# Patient Record
Sex: MALE | Race: WHITE | NOT HISPANIC OR LATINO | Employment: PART TIME | URBAN - METROPOLITAN AREA
[De-identification: names, ages, dates, MRNs, and addresses within clinical notes are randomized per-mention and may not be internally consistent; named-entity substitution may affect disease eponyms.]

---

## 2018-07-11 ENCOUNTER — OFFICE VISIT CONVERTED (OUTPATIENT)
Dept: PODIATRY | Facility: CLINIC | Age: 33
End: 2018-07-11
Attending: PODIATRIST

## 2018-08-13 ENCOUNTER — CONVERSION ENCOUNTER (OUTPATIENT)
Dept: PODIATRY | Facility: CLINIC | Age: 33
End: 2018-08-13

## 2018-08-13 ENCOUNTER — OFFICE VISIT CONVERTED (OUTPATIENT)
Dept: PODIATRY | Facility: CLINIC | Age: 33
End: 2018-08-13
Attending: PODIATRIST

## 2018-08-27 ENCOUNTER — OFFICE VISIT CONVERTED (OUTPATIENT)
Dept: PODIATRY | Facility: CLINIC | Age: 33
End: 2018-08-27
Attending: PODIATRIST

## 2018-08-27 ENCOUNTER — CONVERSION ENCOUNTER (OUTPATIENT)
Dept: PODIATRY | Facility: CLINIC | Age: 33
End: 2018-08-27

## 2018-08-28 ENCOUNTER — CONVERSION ENCOUNTER (OUTPATIENT)
Dept: FAMILY MEDICINE CLINIC | Facility: CLINIC | Age: 33
End: 2018-08-28

## 2018-08-28 ENCOUNTER — OFFICE VISIT CONVERTED (OUTPATIENT)
Dept: FAMILY MEDICINE CLINIC | Facility: CLINIC | Age: 33
End: 2018-08-28
Attending: FAMILY MEDICINE

## 2018-10-05 ENCOUNTER — OFFICE VISIT CONVERTED (OUTPATIENT)
Dept: PODIATRY | Facility: CLINIC | Age: 33
End: 2018-10-05
Attending: PODIATRIST

## 2018-10-05 ENCOUNTER — CONVERSION ENCOUNTER (OUTPATIENT)
Dept: PODIATRY | Facility: CLINIC | Age: 33
End: 2018-10-05

## 2018-10-12 ENCOUNTER — CONVERSION ENCOUNTER (OUTPATIENT)
Dept: OTHER | Facility: HOSPITAL | Age: 33
End: 2018-10-12

## 2018-10-12 ENCOUNTER — OFFICE VISIT CONVERTED (OUTPATIENT)
Dept: FAMILY MEDICINE CLINIC | Facility: CLINIC | Age: 33
End: 2018-10-12
Attending: FAMILY MEDICINE

## 2018-10-15 ENCOUNTER — CONVERSION ENCOUNTER (OUTPATIENT)
Dept: PODIATRY | Facility: CLINIC | Age: 33
End: 2018-10-15

## 2018-10-15 ENCOUNTER — OFFICE VISIT CONVERTED (OUTPATIENT)
Dept: PODIATRY | Facility: CLINIC | Age: 33
End: 2018-10-15
Attending: PODIATRIST

## 2018-10-29 ENCOUNTER — OFFICE VISIT CONVERTED (OUTPATIENT)
Dept: PODIATRY | Facility: CLINIC | Age: 33
End: 2018-10-29
Attending: PODIATRIST

## 2018-12-17 ENCOUNTER — CONVERSION ENCOUNTER (OUTPATIENT)
Dept: PODIATRY | Facility: CLINIC | Age: 33
End: 2018-12-17

## 2018-12-17 ENCOUNTER — OFFICE VISIT CONVERTED (OUTPATIENT)
Dept: PODIATRY | Facility: CLINIC | Age: 33
End: 2018-12-17
Attending: PODIATRIST

## 2019-01-07 ENCOUNTER — CONVERSION ENCOUNTER (OUTPATIENT)
Dept: OTHER | Facility: HOSPITAL | Age: 34
End: 2019-01-07

## 2019-01-07 ENCOUNTER — OFFICE VISIT CONVERTED (OUTPATIENT)
Dept: PODIATRY | Facility: CLINIC | Age: 34
End: 2019-01-07
Attending: PODIATRIST

## 2019-02-15 ENCOUNTER — OFFICE VISIT CONVERTED (OUTPATIENT)
Dept: FAMILY MEDICINE CLINIC | Facility: CLINIC | Age: 34
End: 2019-02-15
Attending: FAMILY MEDICINE

## 2019-04-11 ENCOUNTER — OFFICE VISIT CONVERTED (OUTPATIENT)
Dept: FAMILY MEDICINE CLINIC | Facility: CLINIC | Age: 34
End: 2019-04-11
Attending: FAMILY MEDICINE

## 2019-04-19 ENCOUNTER — HOSPITAL ENCOUNTER (OUTPATIENT)
Dept: CARDIOLOGY | Facility: HOSPITAL | Age: 34
Discharge: HOME | End: 2019-04-19
Attending: FAMILY MEDICINE

## 2019-05-13 ENCOUNTER — OFFICE VISIT CONVERTED (OUTPATIENT)
Dept: FAMILY MEDICINE CLINIC | Facility: CLINIC | Age: 34
End: 2019-05-13
Attending: FAMILY MEDICINE

## 2019-10-08 ENCOUNTER — OFFICE VISIT CONVERTED (OUTPATIENT)
Dept: FAMILY MEDICINE CLINIC | Facility: CLINIC | Age: 34
End: 2019-10-08
Attending: FAMILY MEDICINE

## 2020-06-19 ENCOUNTER — OFFICE VISIT CONVERTED (OUTPATIENT)
Dept: FAMILY MEDICINE CLINIC | Facility: CLINIC | Age: 35
End: 2020-06-19
Attending: FAMILY MEDICINE

## 2020-08-11 ENCOUNTER — OFFICE VISIT CONVERTED (OUTPATIENT)
Dept: FAMILY MEDICINE CLINIC | Facility: CLINIC | Age: 35
End: 2020-08-11
Attending: FAMILY MEDICINE

## 2020-10-19 ENCOUNTER — CONVERSION ENCOUNTER (OUTPATIENT)
Dept: GASTROENTEROLOGY | Facility: CLINIC | Age: 35
End: 2020-10-19
Attending: INTERNAL MEDICINE

## 2020-11-06 ENCOUNTER — HOSPITAL ENCOUNTER (OUTPATIENT)
Dept: URGENT CARE | Facility: CLINIC | Age: 35
Discharge: HOME | End: 2020-11-06
Attending: FAMILY MEDICINE

## 2020-11-11 LAB — SARS-COV-2 RNA SPEC QL NAA+PROBE: NOT DETECTED

## 2020-12-22 ENCOUNTER — HOSPITAL ENCOUNTER (OUTPATIENT)
Dept: GASTROENTEROLOGY | Facility: HOSPITAL | Age: 35
Setting detail: HOSPITAL OUTPATIENT SURGERY
Discharge: HOME | End: 2020-12-22
Attending: INTERNAL MEDICINE

## 2021-03-16 ENCOUNTER — OFFICE VISIT CONVERTED (OUTPATIENT)
Dept: FAMILY MEDICINE CLINIC | Facility: CLINIC | Age: 36
End: 2021-03-16
Attending: FAMILY MEDICINE

## 2021-05-04 ENCOUNTER — HOSPITAL ENCOUNTER (OUTPATIENT)
Dept: PHYSICAL THERAPY | Facility: CLINIC | Age: 36
Setting detail: RECURRING SERIES
Discharge: HOME | End: 2021-05-05
Attending: FAMILY MEDICINE

## 2021-05-14 ENCOUNTER — HOSPITAL ENCOUNTER (OUTPATIENT)
Dept: URGENT CARE | Facility: CLINIC | Age: 36
Discharge: HOME | End: 2021-05-14
Attending: FAMILY MEDICINE

## 2021-05-14 VITALS
DIASTOLIC BLOOD PRESSURE: 73 MMHG | BODY MASS INDEX: 23.29 KG/M2 | HEART RATE: 83 BPM | OXYGEN SATURATION: 100 % | SYSTOLIC BLOOD PRESSURE: 107 MMHG | TEMPERATURE: 97.5 F | HEIGHT: 77 IN | WEIGHT: 197.25 LBS

## 2021-05-15 VITALS
HEART RATE: 88 BPM | OXYGEN SATURATION: 98 % | HEIGHT: 77 IN | BODY MASS INDEX: 24.09 KG/M2 | WEIGHT: 204 LBS | SYSTOLIC BLOOD PRESSURE: 125 MMHG | DIASTOLIC BLOOD PRESSURE: 73 MMHG | TEMPERATURE: 98.8 F

## 2021-05-15 VITALS
HEIGHT: 77 IN | TEMPERATURE: 98.6 F | HEART RATE: 86 BPM | OXYGEN SATURATION: 98 % | RESPIRATION RATE: 12 BRPM | BODY MASS INDEX: 30.14 KG/M2 | WEIGHT: 255.25 LBS | DIASTOLIC BLOOD PRESSURE: 71 MMHG | SYSTOLIC BLOOD PRESSURE: 133 MMHG

## 2021-05-15 VITALS
DIASTOLIC BLOOD PRESSURE: 70 MMHG | SYSTOLIC BLOOD PRESSURE: 108 MMHG | TEMPERATURE: 98.6 F | OXYGEN SATURATION: 99 % | BODY MASS INDEX: 23.38 KG/M2 | HEART RATE: 88 BPM | WEIGHT: 198 LBS | HEIGHT: 77 IN

## 2021-05-15 VITALS
WEIGHT: 267 LBS | OXYGEN SATURATION: 99 % | SYSTOLIC BLOOD PRESSURE: 134 MMHG | DIASTOLIC BLOOD PRESSURE: 90 MMHG | HEART RATE: 82 BPM | BODY MASS INDEX: 31.53 KG/M2 | HEIGHT: 77 IN

## 2021-05-15 VITALS
HEART RATE: 78 BPM | HEIGHT: 77 IN | SYSTOLIC BLOOD PRESSURE: 108 MMHG | DIASTOLIC BLOOD PRESSURE: 77 MMHG | OXYGEN SATURATION: 98 % | WEIGHT: 265 LBS | BODY MASS INDEX: 31.29 KG/M2

## 2021-05-15 VITALS
DIASTOLIC BLOOD PRESSURE: 60 MMHG | SYSTOLIC BLOOD PRESSURE: 111 MMHG | BODY MASS INDEX: 28.57 KG/M2 | WEIGHT: 242 LBS | HEART RATE: 79 BPM | OXYGEN SATURATION: 99 % | HEIGHT: 77 IN | TEMPERATURE: 98.1 F

## 2021-05-15 VITALS
HEIGHT: 77 IN | SYSTOLIC BLOOD PRESSURE: 131 MMHG | BODY MASS INDEX: 29.64 KG/M2 | HEART RATE: 69 BPM | WEIGHT: 251 LBS | DIASTOLIC BLOOD PRESSURE: 86 MMHG

## 2021-05-15 VITALS
HEART RATE: 74 BPM | WEIGHT: 261.12 LBS | HEIGHT: 77 IN | DIASTOLIC BLOOD PRESSURE: 88 MMHG | OXYGEN SATURATION: 99 % | BODY MASS INDEX: 30.83 KG/M2 | SYSTOLIC BLOOD PRESSURE: 116 MMHG | TEMPERATURE: 97.4 F

## 2021-05-16 VITALS
HEIGHT: 77 IN | SYSTOLIC BLOOD PRESSURE: 152 MMHG | HEART RATE: 89 BPM | DIASTOLIC BLOOD PRESSURE: 90 MMHG | BODY MASS INDEX: 31.53 KG/M2 | OXYGEN SATURATION: 97 % | WEIGHT: 267 LBS

## 2021-05-16 VITALS — HEART RATE: 117 BPM | BODY MASS INDEX: 30.46 KG/M2 | HEIGHT: 77 IN | OXYGEN SATURATION: 98 % | WEIGHT: 258 LBS

## 2021-05-16 VITALS
OXYGEN SATURATION: 98 % | BODY MASS INDEX: 41.28 KG/M2 | HEIGHT: 67 IN | HEART RATE: 77 BPM | WEIGHT: 263 LBS | SYSTOLIC BLOOD PRESSURE: 133 MMHG | DIASTOLIC BLOOD PRESSURE: 83 MMHG

## 2021-05-16 VITALS
DIASTOLIC BLOOD PRESSURE: 85 MMHG | BODY MASS INDEX: 31.17 KG/M2 | WEIGHT: 264 LBS | SYSTOLIC BLOOD PRESSURE: 128 MMHG | HEART RATE: 77 BPM | HEIGHT: 77 IN | OXYGEN SATURATION: 97 %

## 2021-05-16 VITALS
HEART RATE: 85 BPM | SYSTOLIC BLOOD PRESSURE: 130 MMHG | DIASTOLIC BLOOD PRESSURE: 70 MMHG | WEIGHT: 263 LBS | BODY MASS INDEX: 31.05 KG/M2 | OXYGEN SATURATION: 98 % | HEIGHT: 77 IN

## 2021-05-16 VITALS
WEIGHT: 255 LBS | SYSTOLIC BLOOD PRESSURE: 131 MMHG | HEIGHT: 77 IN | OXYGEN SATURATION: 100 % | HEART RATE: 95 BPM | DIASTOLIC BLOOD PRESSURE: 79 MMHG | BODY MASS INDEX: 30.11 KG/M2

## 2021-05-16 VITALS
BODY MASS INDEX: 31.17 KG/M2 | HEIGHT: 77 IN | HEART RATE: 81 BPM | OXYGEN SATURATION: 100 % | TEMPERATURE: 99.1 F | WEIGHT: 264 LBS | SYSTOLIC BLOOD PRESSURE: 126 MMHG | DIASTOLIC BLOOD PRESSURE: 66 MMHG | RESPIRATION RATE: 18 BRPM

## 2021-05-16 VITALS — BODY MASS INDEX: 30.82 KG/M2 | WEIGHT: 261 LBS | OXYGEN SATURATION: 99 % | HEIGHT: 77 IN | HEART RATE: 88 BPM

## 2021-07-13 ENCOUNTER — OFFICE VISIT (OUTPATIENT)
Dept: NEUROSURGERY | Facility: CLINIC | Age: 36
End: 2021-07-13

## 2021-07-13 VITALS
HEIGHT: 77 IN | OXYGEN SATURATION: 99 % | WEIGHT: 204.3 LBS | SYSTOLIC BLOOD PRESSURE: 126 MMHG | BODY MASS INDEX: 24.12 KG/M2 | TEMPERATURE: 97.6 F | DIASTOLIC BLOOD PRESSURE: 70 MMHG | HEART RATE: 85 BPM

## 2021-07-13 DIAGNOSIS — M54.41 CHRONIC BILATERAL LOW BACK PAIN WITH BILATERAL SCIATICA: Primary | ICD-10-CM

## 2021-07-13 DIAGNOSIS — G89.29 CHRONIC BILATERAL LOW BACK PAIN WITH BILATERAL SCIATICA: Primary | ICD-10-CM

## 2021-07-13 DIAGNOSIS — M54.42 CHRONIC BILATERAL LOW BACK PAIN WITH BILATERAL SCIATICA: Primary | ICD-10-CM

## 2021-07-13 DIAGNOSIS — M51.16 LUMBAR DISC HERNIATION WITH RADICULOPATHY: ICD-10-CM

## 2021-07-13 PROCEDURE — 99204 OFFICE O/P NEW MOD 45 MIN: CPT | Performed by: NURSE PRACTITIONER

## 2021-07-28 ENCOUNTER — HOSPITAL ENCOUNTER (OUTPATIENT)
Dept: GENERAL RADIOLOGY | Facility: HOSPITAL | Age: 36
Discharge: HOME | End: 2021-07-28
Admitting: NURSE PRACTITIONER

## 2021-07-28 ENCOUNTER — OFFICE VISIT (OUTPATIENT)
Dept: FAMILY MEDICINE CLINIC | Facility: CLINIC | Age: 36
End: 2021-07-28

## 2021-07-28 VITALS
SYSTOLIC BLOOD PRESSURE: 128 MMHG | HEART RATE: 96 BPM | TEMPERATURE: 96.7 F | WEIGHT: 200 LBS | DIASTOLIC BLOOD PRESSURE: 88 MMHG | HEIGHT: 77 IN | BODY MASS INDEX: 23.62 KG/M2 | OXYGEN SATURATION: 98 %

## 2021-07-28 DIAGNOSIS — R10.9 ACUTE LEFT FLANK PAIN: ICD-10-CM

## 2021-07-28 DIAGNOSIS — R39.15 URINARY URGENCY: ICD-10-CM

## 2021-07-28 DIAGNOSIS — R39.198 DIFFICULTY URINATING: Primary | ICD-10-CM

## 2021-07-28 DIAGNOSIS — R31.9 HEMATURIA, UNSPECIFIED TYPE: ICD-10-CM

## 2021-07-28 DIAGNOSIS — R39.198 DIFFICULTY URINATING: ICD-10-CM

## 2021-07-28 PROBLEM — M79.671 FOOT PAIN, RIGHT: Status: ACTIVE | Noted: 2018-07-11

## 2021-07-28 PROBLEM — M54.50 CHRONIC BILATERAL LOW BACK PAIN WITHOUT SCIATICA: Status: ACTIVE | Noted: 2019-02-15

## 2021-07-28 PROBLEM — M79.672 FOOT PAIN, LEFT: Status: ACTIVE | Noted: 2018-07-11

## 2021-07-28 PROBLEM — L60.0 INGROWN NAIL: Status: ACTIVE | Noted: 2018-08-13

## 2021-07-28 PROBLEM — G89.29 CHRONIC BILATERAL LOW BACK PAIN WITHOUT SCIATICA: Status: ACTIVE | Noted: 2019-02-15

## 2021-07-28 PROBLEM — B35.1 TINEA UNGUIUM: Status: ACTIVE | Noted: 2018-07-11

## 2021-07-28 PROBLEM — F41.9 ANXIETY DISORDER: Status: ACTIVE | Noted: 2018-08-28

## 2021-07-28 LAB
BACTERIA UR QL AUTO: ABNORMAL /HPF
BILIRUB BLD-MCNC: ABNORMAL MG/DL
BILIRUB UR QL STRIP: NEGATIVE
CLARITY UR: ABNORMAL
CLARITY, POC: CLEAR
COLOR UR: ABNORMAL
COLOR UR: YELLOW
GLUCOSE UR STRIP-MCNC: NEGATIVE MG/DL
GLUCOSE UR STRIP-MCNC: NEGATIVE MG/DL
HGB UR QL STRIP.AUTO: ABNORMAL
HYALINE CASTS UR QL AUTO: ABNORMAL /LPF
KETONES UR QL STRIP: ABNORMAL
KETONES UR QL: ABNORMAL
LEUKOCYTE EST, POC: NEGATIVE
LEUKOCYTE ESTERASE UR QL STRIP.AUTO: NEGATIVE
NITRITE UR QL STRIP: NEGATIVE
NITRITE UR-MCNC: NEGATIVE MG/ML
PH UR STRIP.AUTO: 6 [PH] (ref 5–8)
PH UR: 5.5 [PH] (ref 5–8)
PROT UR QL STRIP: ABNORMAL
PROT UR STRIP-MCNC: ABNORMAL MG/DL
RBC # UR STRIP: ABNORMAL /UL
RBC # UR: ABNORMAL /HPF
REF LAB TEST METHOD: ABNORMAL
SP GR UR STRIP: 1.02 (ref 1–1.03)
SP GR UR: 1.03 (ref 1–1.03)
SQUAMOUS #/AREA URNS HPF: ABNORMAL /HPF
UROBILINOGEN UR QL STRIP: ABNORMAL
UROBILINOGEN UR QL: NORMAL
WBC UR QL AUTO: ABNORMAL /HPF

## 2021-07-28 PROCEDURE — 74018 RADEX ABDOMEN 1 VIEW: CPT

## 2021-07-28 PROCEDURE — 99214 OFFICE O/P EST MOD 30 MIN: CPT | Performed by: NURSE PRACTITIONER

## 2021-07-28 PROCEDURE — 81001 URINALYSIS AUTO W/SCOPE: CPT | Performed by: NURSE PRACTITIONER

## 2021-07-28 RX ORDER — CIPROFLOXACIN 500 MG/1
500 TABLET, FILM COATED ORAL 2 TIMES DAILY
Qty: 10 TABLET | Refills: 0 | Status: SHIPPED | OUTPATIENT
Start: 2021-07-28 | End: 2021-07-30 | Stop reason: ALTCHOICE

## 2021-07-28 RX ORDER — FLUOXETINE HYDROCHLORIDE 20 MG/5ML
LIQUID ORAL
COMMUNITY
Start: 2021-05-24 | End: 2021-08-31

## 2021-07-29 ENCOUNTER — DOCUMENTATION (OUTPATIENT)
Dept: FAMILY MEDICINE CLINIC | Facility: CLINIC | Age: 36
End: 2021-07-29

## 2021-07-30 ENCOUNTER — TELEPHONE (OUTPATIENT)
Dept: FAMILY MEDICINE CLINIC | Facility: CLINIC | Age: 36
End: 2021-07-30

## 2021-07-30 RX ORDER — CIPROFLOXACIN 500 MG/1
500 TABLET, FILM COATED ORAL 2 TIMES DAILY
COMMUNITY
End: 2021-08-31

## 2021-08-03 ENCOUNTER — APPOINTMENT (OUTPATIENT)
Dept: MRI IMAGING | Facility: HOSPITAL | Age: 36
End: 2021-08-03

## 2021-08-04 ENCOUNTER — HOSPITAL ENCOUNTER (OUTPATIENT)
Dept: MRI IMAGING | Facility: HOSPITAL | Age: 36
Discharge: HOME | End: 2021-08-04
Admitting: NURSE PRACTITIONER

## 2021-08-04 DIAGNOSIS — G89.29 CHRONIC BILATERAL LOW BACK PAIN WITH BILATERAL SCIATICA: ICD-10-CM

## 2021-08-04 DIAGNOSIS — M54.41 CHRONIC BILATERAL LOW BACK PAIN WITH BILATERAL SCIATICA: ICD-10-CM

## 2021-08-04 DIAGNOSIS — M54.42 CHRONIC BILATERAL LOW BACK PAIN WITH BILATERAL SCIATICA: ICD-10-CM

## 2021-08-04 PROCEDURE — 72148 MRI LUMBAR SPINE W/O DYE: CPT

## 2021-08-05 ENCOUNTER — TELEPHONE (OUTPATIENT)
Dept: FAMILY MEDICINE CLINIC | Facility: CLINIC | Age: 36
End: 2021-08-05

## 2021-08-09 ENCOUNTER — TELEPHONE (OUTPATIENT)
Dept: FAMILY MEDICINE CLINIC | Facility: CLINIC | Age: 36
End: 2021-08-09

## 2021-08-10 ENCOUNTER — TELEPHONE (OUTPATIENT)
Dept: FAMILY MEDICINE CLINIC | Facility: CLINIC | Age: 36
End: 2021-08-10

## 2021-08-18 ENCOUNTER — TELEPHONE (OUTPATIENT)
Dept: FAMILY MEDICINE CLINIC | Facility: CLINIC | Age: 36
End: 2021-08-18

## 2021-08-19 ENCOUNTER — TELEPHONE (OUTPATIENT)
Dept: FAMILY MEDICINE CLINIC | Facility: CLINIC | Age: 36
End: 2021-08-19

## 2021-08-25 ENCOUNTER — TELEPHONE (OUTPATIENT)
Dept: FAMILY MEDICINE CLINIC | Facility: CLINIC | Age: 36
End: 2021-08-25

## 2021-08-27 DIAGNOSIS — R82.90 ABNORMAL URINALYSIS: Primary | ICD-10-CM

## 2021-08-31 ENCOUNTER — OFFICE VISIT (OUTPATIENT)
Dept: NEUROSURGERY | Facility: CLINIC | Age: 36
End: 2021-08-31

## 2021-08-31 VITALS — HEIGHT: 77 IN | WEIGHT: 202 LBS | BODY MASS INDEX: 23.85 KG/M2

## 2021-08-31 DIAGNOSIS — M51.26 LUMBAR DISC HERNIATION: Primary | ICD-10-CM

## 2021-08-31 DIAGNOSIS — M51.36 DEGENERATIVE DISC DISEASE, LUMBAR: ICD-10-CM

## 2021-08-31 PROCEDURE — 99213 OFFICE O/P EST LOW 20 MIN: CPT | Performed by: NURSE PRACTITIONER

## 2021-09-03 ENCOUNTER — CLINICAL SUPPORT (OUTPATIENT)
Dept: FAMILY MEDICINE CLINIC | Facility: CLINIC | Age: 36
End: 2021-09-03

## 2021-09-03 DIAGNOSIS — R82.90 ABNORMAL URINE: Primary | ICD-10-CM

## 2021-09-03 DIAGNOSIS — R82.90 ABNORMAL URINALYSIS: ICD-10-CM

## 2021-09-03 PROCEDURE — 87086 URINE CULTURE/COLONY COUNT: CPT | Performed by: NURSE PRACTITIONER

## 2021-09-03 PROCEDURE — 81005 URINALYSIS: CPT | Performed by: NURSE PRACTITIONER

## 2021-09-05 LAB — BACTERIA SPEC AEROBE CULT: NO GROWTH

## 2021-09-07 ENCOUNTER — TELEPHONE (OUTPATIENT)
Dept: FAMILY MEDICINE CLINIC | Facility: CLINIC | Age: 36
End: 2021-09-07

## 2021-09-09 ENCOUNTER — TELEPHONE (OUTPATIENT)
Dept: FAMILY MEDICINE CLINIC | Facility: CLINIC | Age: 36
End: 2021-09-09

## 2021-09-20 ENCOUNTER — OFFICE VISIT (OUTPATIENT)
Dept: FAMILY MEDICINE CLINIC | Facility: CLINIC | Age: 36
End: 2021-09-20

## 2021-09-20 VITALS
HEART RATE: 80 BPM | BODY MASS INDEX: 24.02 KG/M2 | OXYGEN SATURATION: 98 % | SYSTOLIC BLOOD PRESSURE: 114 MMHG | TEMPERATURE: 97.9 F | HEIGHT: 77 IN | WEIGHT: 203.4 LBS | DIASTOLIC BLOOD PRESSURE: 69 MMHG

## 2021-09-20 DIAGNOSIS — F41.1 GENERALIZED ANXIETY DISORDER: Primary | ICD-10-CM

## 2021-09-20 DIAGNOSIS — R30.0 DYSURIA: ICD-10-CM

## 2021-09-20 DIAGNOSIS — H61.23 BILATERAL IMPACTED CERUMEN: ICD-10-CM

## 2021-09-20 LAB
BILIRUB UR QL STRIP: NEGATIVE
CLARITY UR: CLEAR
COLOR UR: YELLOW
GLUCOSE UR STRIP-MCNC: NEGATIVE MG/DL
HGB UR QL STRIP.AUTO: NEGATIVE
KETONES UR QL STRIP: NEGATIVE
LEUKOCYTE ESTERASE UR QL STRIP.AUTO: NEGATIVE
NITRITE UR QL STRIP: NEGATIVE
PH UR STRIP.AUTO: 6 [PH] (ref 5–8)
PROT UR QL STRIP: NEGATIVE
SP GR UR STRIP: 1.02 (ref 1–1.03)
T4 FREE SERPL-MCNC: 1.25 NG/DL (ref 0.93–1.7)
TSH SERPL DL<=0.05 MIU/L-ACNC: 1.73 UIU/ML (ref 0.27–4.2)
UROBILINOGEN UR QL STRIP: NORMAL

## 2021-09-20 PROCEDURE — 81003 URINALYSIS AUTO W/O SCOPE: CPT | Performed by: FAMILY MEDICINE

## 2021-09-20 PROCEDURE — 99213 OFFICE O/P EST LOW 20 MIN: CPT | Performed by: FAMILY MEDICINE

## 2021-09-20 PROCEDURE — 84439 ASSAY OF FREE THYROXINE: CPT | Performed by: FAMILY MEDICINE

## 2021-09-20 PROCEDURE — 84443 ASSAY THYROID STIM HORMONE: CPT | Performed by: FAMILY MEDICINE

## 2022-04-29 ENCOUNTER — TELEPHONE (OUTPATIENT)
Dept: FAMILY MEDICINE CLINIC | Facility: CLINIC | Age: 37
End: 2022-04-29

## 2022-09-13 ENCOUNTER — TELEPHONE (OUTPATIENT)
Dept: FAMILY MEDICINE CLINIC | Facility: CLINIC | Age: 37
End: 2022-09-13

## 2022-10-11 ENCOUNTER — OFFICE VISIT (OUTPATIENT)
Dept: FAMILY MEDICINE CLINIC | Facility: CLINIC | Age: 37
End: 2022-10-11

## 2022-10-11 VITALS
SYSTOLIC BLOOD PRESSURE: 141 MMHG | BODY MASS INDEX: 29.52 KG/M2 | HEIGHT: 77 IN | HEART RATE: 93 BPM | WEIGHT: 250 LBS | TEMPERATURE: 98 F | DIASTOLIC BLOOD PRESSURE: 91 MMHG | OXYGEN SATURATION: 97 %

## 2022-10-11 DIAGNOSIS — F41.1 GENERALIZED ANXIETY DISORDER: ICD-10-CM

## 2022-10-11 DIAGNOSIS — H61.23 BILATERAL IMPACTED CERUMEN: Primary | ICD-10-CM

## 2022-10-11 PROCEDURE — 99213 OFFICE O/P EST LOW 20 MIN: CPT | Performed by: FAMILY MEDICINE

## 2022-10-11 RX ORDER — PROPRANOLOL HYDROCHLORIDE 20 MG/5ML
SOLUTION ORAL
COMMUNITY
Start: 2022-09-21 | End: 2022-10-11

## 2022-10-13 ENCOUNTER — TELEPHONE (OUTPATIENT)
Dept: FAMILY MEDICINE CLINIC | Facility: CLINIC | Age: 37
End: 2022-10-13

## 2022-10-24 ENCOUNTER — OFFICE VISIT (OUTPATIENT)
Dept: FAMILY MEDICINE CLINIC | Facility: CLINIC | Age: 37
End: 2022-10-24

## 2022-10-24 ENCOUNTER — HOSPITAL ENCOUNTER (OUTPATIENT)
Dept: GENERAL RADIOLOGY | Facility: HOSPITAL | Age: 37
Discharge: HOME | End: 2022-10-24

## 2022-10-24 ENCOUNTER — LAB (OUTPATIENT)
Dept: LAB | Facility: HOSPITAL | Age: 37
End: 2022-10-24

## 2022-10-24 VITALS
TEMPERATURE: 100 F | OXYGEN SATURATION: 97 % | SYSTOLIC BLOOD PRESSURE: 133 MMHG | WEIGHT: 249.6 LBS | DIASTOLIC BLOOD PRESSURE: 85 MMHG | HEART RATE: 87 BPM | BODY MASS INDEX: 29.6 KG/M2

## 2022-10-24 DIAGNOSIS — R30.0 DYSURIA: Primary | ICD-10-CM

## 2022-10-24 DIAGNOSIS — R10.9 ACUTE LEFT FLANK PAIN: ICD-10-CM

## 2022-10-24 DIAGNOSIS — R39.198 DIFFICULTY URINATING: ICD-10-CM

## 2022-10-24 DIAGNOSIS — R39.198 DIFFICULTY URINATING: Primary | ICD-10-CM

## 2022-10-24 LAB
BILIRUB BLD-MCNC: NEGATIVE MG/DL
CLARITY, POC: CLEAR
COLOR UR: YELLOW
EXPIRATION DATE: NORMAL
GLUCOSE UR STRIP-MCNC: NEGATIVE MG/DL
KETONES UR QL: NEGATIVE
LEUKOCYTE EST, POC: NEGATIVE
Lab: NORMAL
NITRITE UR-MCNC: NEGATIVE MG/ML
PH UR: 7 [PH] (ref 5–8)
PROT UR STRIP-MCNC: NEGATIVE MG/DL
PSA SERPL-MCNC: 0.75 NG/ML (ref 0–4)
RBC # UR STRIP: NEGATIVE /UL
SP GR UR: 1.02 (ref 1–1.03)
UROBILINOGEN UR QL: NORMAL

## 2022-10-24 PROCEDURE — 36415 COLL VENOUS BLD VENIPUNCTURE: CPT

## 2022-10-24 PROCEDURE — 99213 OFFICE O/P EST LOW 20 MIN: CPT | Performed by: NURSE PRACTITIONER

## 2022-10-24 PROCEDURE — 74018 RADEX ABDOMEN 1 VIEW: CPT

## 2022-10-24 PROCEDURE — 84153 ASSAY OF PSA TOTAL: CPT

## 2022-10-25 ENCOUNTER — TELEPHONE (OUTPATIENT)
Dept: FAMILY MEDICINE CLINIC | Facility: CLINIC | Age: 37
End: 2022-10-25

## 2022-10-26 ENCOUNTER — TELEPHONE (OUTPATIENT)
Dept: FAMILY MEDICINE CLINIC | Facility: CLINIC | Age: 37
End: 2022-10-26

## 2022-10-27 ENCOUNTER — OFFICE VISIT (OUTPATIENT)
Dept: FAMILY MEDICINE CLINIC | Facility: CLINIC | Age: 37
End: 2022-10-27

## 2022-10-27 ENCOUNTER — TELEPHONE (OUTPATIENT)
Dept: FAMILY MEDICINE CLINIC | Facility: CLINIC | Age: 37
End: 2022-10-27

## 2022-10-27 VITALS
BODY MASS INDEX: 29.4 KG/M2 | DIASTOLIC BLOOD PRESSURE: 82 MMHG | OXYGEN SATURATION: 96 % | TEMPERATURE: 96.9 F | HEIGHT: 77 IN | WEIGHT: 249 LBS | SYSTOLIC BLOOD PRESSURE: 130 MMHG | HEART RATE: 85 BPM

## 2022-10-27 DIAGNOSIS — R30.0 DYSURIA: ICD-10-CM

## 2022-10-27 DIAGNOSIS — R35.0 URINE FREQUENCY: Primary | ICD-10-CM

## 2022-10-27 LAB
BILIRUB BLD-MCNC: ABNORMAL MG/DL
CLARITY, POC: CLEAR
COLOR UR: YELLOW
EXPIRATION DATE: ABNORMAL
GLUCOSE UR STRIP-MCNC: NEGATIVE MG/DL
KETONES UR QL: NEGATIVE
LEUKOCYTE EST, POC: NEGATIVE
Lab: ABNORMAL
NITRITE UR-MCNC: NEGATIVE MG/ML
PH UR: 5.5 [PH] (ref 5–8)
PROT UR STRIP-MCNC: ABNORMAL MG/DL
RBC # UR STRIP: ABNORMAL /UL
SP GR UR: 1.03 (ref 1–1.03)
UROBILINOGEN UR QL: NORMAL

## 2022-10-27 PROCEDURE — 99213 OFFICE O/P EST LOW 20 MIN: CPT | Performed by: FAMILY MEDICINE

## 2022-10-27 RX ORDER — TAMSULOSIN HYDROCHLORIDE 0.4 MG/1
1 CAPSULE ORAL DAILY
Qty: 30 CAPSULE | Refills: 1 | Status: SHIPPED | OUTPATIENT
Start: 2022-10-27 | End: 2022-12-12

## 2022-10-27 RX ORDER — SULFAMETHOXAZOLE AND TRIMETHOPRIM 800; 160 MG/1; MG/1
1 TABLET ORAL 2 TIMES DAILY
Qty: 28 TABLET | Refills: 0 | Status: SHIPPED | OUTPATIENT
Start: 2022-10-27 | End: 2022-11-10

## 2022-11-01 ENCOUNTER — TELEPHONE (OUTPATIENT)
Dept: FAMILY MEDICINE CLINIC | Facility: CLINIC | Age: 37
End: 2022-11-01

## 2022-11-01 DIAGNOSIS — R31.9 HEMATURIA, UNSPECIFIED TYPE: ICD-10-CM

## 2022-11-01 DIAGNOSIS — R30.0 DYSURIA: ICD-10-CM

## 2022-11-01 DIAGNOSIS — R39.198 DIFFICULTY URINATING: ICD-10-CM

## 2022-11-01 DIAGNOSIS — R35.0 URINE FREQUENCY: Primary | ICD-10-CM

## 2022-11-09 ENCOUNTER — TELEPHONE (OUTPATIENT)
Dept: FAMILY MEDICINE CLINIC | Facility: CLINIC | Age: 37
End: 2022-11-09

## 2022-11-09 DIAGNOSIS — Z00.00 ANNUAL PHYSICAL EXAM: Primary | ICD-10-CM

## 2022-12-08 ENCOUNTER — LAB (OUTPATIENT)
Dept: LAB | Facility: HOSPITAL | Age: 37
End: 2022-12-08

## 2022-12-08 LAB
ALBUMIN SERPL-MCNC: 5 G/DL (ref 3.5–5.2)
ALBUMIN/GLOB SERPL: 2.6 G/DL
ALP SERPL-CCNC: 68 U/L (ref 39–117)
ALT SERPL W P-5'-P-CCNC: 21 U/L (ref 1–41)
ANION GAP SERPL CALCULATED.3IONS-SCNC: 8.8 MMOL/L (ref 5–15)
AST SERPL-CCNC: 19 U/L (ref 1–40)
BASOPHILS # BLD AUTO: 0.07 10*3/MM3 (ref 0–0.2)
BASOPHILS NFR BLD AUTO: 1.2 % (ref 0–1.5)
BILIRUB SERPL-MCNC: 0.7 MG/DL (ref 0–1.2)
BUN SERPL-MCNC: 9 MG/DL (ref 6–20)
BUN/CREAT SERPL: 9.5 (ref 7–25)
CALCIUM SPEC-SCNC: 9.3 MG/DL (ref 8.6–10.5)
CHLORIDE SERPL-SCNC: 109 MMOL/L (ref 98–107)
CHOLEST SERPL-MCNC: 167 MG/DL (ref 0–200)
CO2 SERPL-SCNC: 26.2 MMOL/L (ref 22–29)
CREAT SERPL-MCNC: 0.95 MG/DL (ref 0.76–1.27)
DEPRECATED RDW RBC AUTO: 39.8 FL (ref 37–54)
EGFRCR SERPLBLD CKD-EPI 2021: 105.1 ML/MIN/1.73
EOSINOPHIL # BLD AUTO: 0.2 10*3/MM3 (ref 0–0.4)
EOSINOPHIL NFR BLD AUTO: 3.5 % (ref 0.3–6.2)
ERYTHROCYTE [DISTWIDTH] IN BLOOD BY AUTOMATED COUNT: 12.8 % (ref 12.3–15.4)
GLOBULIN UR ELPH-MCNC: 1.9 GM/DL
GLUCOSE SERPL-MCNC: 102 MG/DL (ref 65–99)
HCT VFR BLD AUTO: 43.4 % (ref 37.5–51)
HDLC SERPL-MCNC: 34 MG/DL (ref 40–60)
HGB BLD-MCNC: 14.8 G/DL (ref 13–17.7)
IMM GRANULOCYTES # BLD AUTO: 0.01 10*3/MM3 (ref 0–0.05)
IMM GRANULOCYTES NFR BLD AUTO: 0.2 % (ref 0–0.5)
LDLC SERPL CALC-MCNC: 121 MG/DL (ref 0–100)
LDLC/HDLC SERPL: 3.56 {RATIO}
LYMPHOCYTES # BLD AUTO: 2.63 10*3/MM3 (ref 0.7–3.1)
LYMPHOCYTES NFR BLD AUTO: 46.4 % (ref 19.6–45.3)
MCH RBC QN AUTO: 29.5 PG (ref 26.6–33)
MCHC RBC AUTO-ENTMCNC: 34.1 G/DL (ref 31.5–35.7)
MCV RBC AUTO: 86.5 FL (ref 79–97)
MONOCYTES # BLD AUTO: 0.48 10*3/MM3 (ref 0.1–0.9)
MONOCYTES NFR BLD AUTO: 8.5 % (ref 5–12)
NEUTROPHILS NFR BLD AUTO: 2.28 10*3/MM3 (ref 1.7–7)
NEUTROPHILS NFR BLD AUTO: 40.2 % (ref 42.7–76)
NRBC BLD AUTO-RTO: 0 /100 WBC (ref 0–0.2)
PLATELET # BLD AUTO: 244 10*3/MM3 (ref 140–450)
PMV BLD AUTO: 10.5 FL (ref 6–12)
POTASSIUM SERPL-SCNC: 3.8 MMOL/L (ref 3.5–5.2)
PROT SERPL-MCNC: 6.9 G/DL (ref 6–8.5)
RBC # BLD AUTO: 5.02 10*6/MM3 (ref 4.14–5.8)
SODIUM SERPL-SCNC: 144 MMOL/L (ref 136–145)
TRIGL SERPL-MCNC: 60 MG/DL (ref 0–150)
TSH SERPL DL<=0.05 MIU/L-ACNC: 1.39 UIU/ML (ref 0.27–4.2)
VLDLC SERPL-MCNC: 12 MG/DL (ref 5–40)
WBC NRBC COR # BLD: 5.67 10*3/MM3 (ref 3.4–10.8)

## 2022-12-08 PROCEDURE — 84443 ASSAY THYROID STIM HORMONE: CPT | Performed by: FAMILY MEDICINE

## 2022-12-08 PROCEDURE — 85025 COMPLETE CBC W/AUTO DIFF WBC: CPT | Performed by: FAMILY MEDICINE

## 2022-12-08 PROCEDURE — 80053 COMPREHEN METABOLIC PANEL: CPT | Performed by: FAMILY MEDICINE

## 2022-12-08 PROCEDURE — 80061 LIPID PANEL: CPT | Performed by: FAMILY MEDICINE

## 2022-12-09 ENCOUNTER — TELEPHONE (OUTPATIENT)
Dept: FAMILY MEDICINE CLINIC | Facility: CLINIC | Age: 37
End: 2022-12-09

## 2022-12-12 ENCOUNTER — TELEMEDICINE (OUTPATIENT)
Dept: FAMILY MEDICINE CLINIC | Facility: CLINIC | Age: 37
End: 2022-12-12

## 2022-12-12 DIAGNOSIS — F41.1 GENERALIZED ANXIETY DISORDER: Primary | ICD-10-CM

## 2022-12-12 DIAGNOSIS — R30.0 DYSURIA: ICD-10-CM

## 2022-12-12 PROCEDURE — 99213 OFFICE O/P EST LOW 20 MIN: CPT | Performed by: FAMILY MEDICINE

## 2022-12-12 RX ORDER — CITALOPRAM HYDROBROMIDE 20 MG/10ML
SOLUTION, ORAL ORAL
COMMUNITY
Start: 2022-11-07 | End: 2022-12-12

## 2023-02-09 ENCOUNTER — TELEPHONE (OUTPATIENT)
Dept: FAMILY MEDICINE CLINIC | Facility: CLINIC | Age: 38
End: 2023-02-09

## 2023-02-12 PROBLEM — R31.29 MICROHEMATURIA: Status: ACTIVE | Noted: 2023-02-12

## 2023-02-12 PROBLEM — N40.1 BENIGN PROSTATIC HYPERPLASIA WITH LOWER URINARY TRACT SYMPTOMS: Status: ACTIVE | Noted: 2023-02-12

## 2023-02-14 ENCOUNTER — OFFICE VISIT (OUTPATIENT)
Dept: UROLOGY | Facility: CLINIC | Age: 38
End: 2023-02-14
Payer: MEDICAID

## 2023-02-14 VITALS — WEIGHT: 235.2 LBS | HEIGHT: 77 IN | BODY MASS INDEX: 27.77 KG/M2

## 2023-02-14 DIAGNOSIS — N40.1 BENIGN PROSTATIC HYPERPLASIA WITH LOWER URINARY TRACT SYMPTOMS, SYMPTOM DETAILS UNSPECIFIED: ICD-10-CM

## 2023-02-14 DIAGNOSIS — R31.29 MICROHEMATURIA: Primary | ICD-10-CM

## 2023-02-14 LAB
BILIRUB BLD-MCNC: ABNORMAL MG/DL
CLARITY, POC: CLEAR
COLOR UR: YELLOW
EXPIRATION DATE: ABNORMAL
GLUCOSE UR STRIP-MCNC: NEGATIVE MG/DL
KETONES UR QL: ABNORMAL
LEUKOCYTE EST, POC: NEGATIVE
Lab: ABNORMAL
NITRITE UR-MCNC: NEGATIVE MG/ML
PH UR: 5.5 [PH] (ref 5–8)
PROT UR STRIP-MCNC: ABNORMAL MG/DL
RBC # UR STRIP: ABNORMAL /UL
SP GR UR: 1.03 (ref 1–1.03)
URINE VOLUME: NORMAL
UROBILINOGEN UR QL: NORMAL

## 2023-02-14 PROCEDURE — 99204 OFFICE O/P NEW MOD 45 MIN: CPT | Performed by: UROLOGY

## 2023-02-15 ENCOUNTER — TELEPHONE (OUTPATIENT)
Dept: FAMILY MEDICINE CLINIC | Facility: CLINIC | Age: 38
End: 2023-02-15

## 2023-02-27 ENCOUNTER — OFFICE VISIT (OUTPATIENT)
Dept: FAMILY MEDICINE CLINIC | Facility: CLINIC | Age: 38
End: 2023-02-27
Payer: MEDICAID

## 2023-02-27 VITALS
TEMPERATURE: 99.2 F | WEIGHT: 235 LBS | DIASTOLIC BLOOD PRESSURE: 71 MMHG | HEIGHT: 77 IN | BODY MASS INDEX: 27.75 KG/M2 | OXYGEN SATURATION: 98 % | SYSTOLIC BLOOD PRESSURE: 110 MMHG | HEART RATE: 88 BPM

## 2023-02-27 DIAGNOSIS — K05.6 PERIODONTAL DISEASE: Primary | ICD-10-CM

## 2023-02-27 PROCEDURE — 99213 OFFICE O/P EST LOW 20 MIN: CPT | Performed by: FAMILY MEDICINE

## 2023-06-19 ENCOUNTER — OFFICE VISIT (OUTPATIENT)
Dept: FAMILY MEDICINE CLINIC | Facility: CLINIC | Age: 38
End: 2023-06-19
Payer: MEDICAID

## 2023-06-19 VITALS
BODY MASS INDEX: 25.74 KG/M2 | SYSTOLIC BLOOD PRESSURE: 127 MMHG | HEIGHT: 77 IN | WEIGHT: 218 LBS | HEART RATE: 82 BPM | OXYGEN SATURATION: 98 % | TEMPERATURE: 97 F | DIASTOLIC BLOOD PRESSURE: 85 MMHG

## 2023-06-19 DIAGNOSIS — R25.1 TREMOR: ICD-10-CM

## 2023-06-19 DIAGNOSIS — M25.512 ACUTE PAIN OF LEFT SHOULDER: ICD-10-CM

## 2023-06-19 DIAGNOSIS — J34.89 PAIN OF MAXILLARY SINUS: ICD-10-CM

## 2023-06-19 DIAGNOSIS — Z11.59 NEED FOR HEPATITIS C SCREENING TEST: ICD-10-CM

## 2023-06-19 DIAGNOSIS — H61.23 BILATERAL IMPACTED CERUMEN: Primary | ICD-10-CM

## 2023-06-19 DIAGNOSIS — F41.1 GENERALIZED ANXIETY DISORDER: ICD-10-CM

## 2023-06-19 LAB
ANION GAP SERPL CALCULATED.3IONS-SCNC: 11 MMOL/L (ref 5–15)
BUN SERPL-MCNC: 7 MG/DL (ref 6–20)
BUN/CREAT SERPL: 7.5 (ref 7–25)
CALCIUM SPEC-SCNC: 9.2 MG/DL (ref 8.6–10.5)
CHLORIDE SERPL-SCNC: 106 MMOL/L (ref 98–107)
CO2 SERPL-SCNC: 25 MMOL/L (ref 22–29)
CREAT SERPL-MCNC: 0.93 MG/DL (ref 0.76–1.27)
EGFRCR SERPLBLD CKD-EPI 2021: 107.1 ML/MIN/1.73
GLUCOSE SERPL-MCNC: 125 MG/DL (ref 65–99)
HCV AB SER DONR QL: NORMAL
MAGNESIUM SERPL-MCNC: 2.1 MG/DL (ref 1.6–2.6)
POTASSIUM SERPL-SCNC: 3.9 MMOL/L (ref 3.5–5.2)
SODIUM SERPL-SCNC: 142 MMOL/L (ref 136–145)
T4 FREE SERPL-MCNC: 1.26 NG/DL (ref 0.93–1.7)
TSH SERPL DL<=0.05 MIU/L-ACNC: 1.86 UIU/ML (ref 0.27–4.2)

## 2023-06-19 RX ORDER — ALPRAZOLAM 0.5 MG/1
TABLET ORAL
Qty: 6 TABLET | Refills: 0 | Status: SHIPPED | OUTPATIENT
Start: 2023-06-19

## 2023-06-20 ENCOUNTER — TELEPHONE (OUTPATIENT)
Dept: FAMILY MEDICINE CLINIC | Facility: CLINIC | Age: 38
End: 2023-06-20

## 2023-06-28 ENCOUNTER — TELEPHONE (OUTPATIENT)
Dept: FAMILY MEDICINE CLINIC | Facility: CLINIC | Age: 38
End: 2023-06-28

## 2023-07-03 ENCOUNTER — TELEPHONE (OUTPATIENT)
Dept: FAMILY MEDICINE CLINIC | Facility: CLINIC | Age: 38
End: 2023-07-03

## 2023-07-19 ENCOUNTER — TELEPHONE (OUTPATIENT)
Dept: FAMILY MEDICINE CLINIC | Facility: CLINIC | Age: 38
End: 2023-07-19

## 2023-08-04 ENCOUNTER — HOSPITAL ENCOUNTER (OUTPATIENT)
Dept: GENERAL RADIOLOGY | Facility: HOSPITAL | Age: 38
Discharge: HOME | End: 2023-08-04
Admitting: FAMILY MEDICINE
Payer: MEDICAID

## 2023-08-04 ENCOUNTER — TELEPHONE (OUTPATIENT)
Dept: FAMILY MEDICINE CLINIC | Facility: CLINIC | Age: 38
End: 2023-08-04

## 2023-08-04 DIAGNOSIS — M54.50 CHRONIC BILATERAL LOW BACK PAIN WITHOUT SCIATICA: ICD-10-CM

## 2023-08-04 DIAGNOSIS — M54.50 CHRONIC BILATERAL LOW BACK PAIN WITHOUT SCIATICA: Primary | ICD-10-CM

## 2023-08-04 DIAGNOSIS — G89.29 CHRONIC BILATERAL LOW BACK PAIN WITHOUT SCIATICA: Primary | ICD-10-CM

## 2023-08-04 DIAGNOSIS — G89.29 CHRONIC BILATERAL LOW BACK PAIN WITHOUT SCIATICA: ICD-10-CM

## 2023-08-04 PROCEDURE — 72050 X-RAY EXAM NECK SPINE 4/5VWS: CPT

## 2023-08-08 ENCOUNTER — OFFICE VISIT (OUTPATIENT)
Dept: FAMILY MEDICINE CLINIC | Facility: CLINIC | Age: 38
End: 2023-08-08
Payer: MEDICAID

## 2023-08-08 VITALS
WEIGHT: 209 LBS | HEART RATE: 88 BPM | BODY MASS INDEX: 24.68 KG/M2 | DIASTOLIC BLOOD PRESSURE: 77 MMHG | SYSTOLIC BLOOD PRESSURE: 117 MMHG | TEMPERATURE: 97 F | HEIGHT: 77 IN | OXYGEN SATURATION: 100 %

## 2023-08-08 DIAGNOSIS — M25.512 ACUTE PAIN OF LEFT SHOULDER: Primary | ICD-10-CM

## 2023-08-08 PROCEDURE — 99213 OFFICE O/P EST LOW 20 MIN: CPT | Performed by: FAMILY MEDICINE

## 2023-08-19 ENCOUNTER — HOSPITAL ENCOUNTER (EMERGENCY)
Facility: HOSPITAL | Age: 38
Discharge: HOME | End: 2023-08-19
Attending: EMERGENCY MEDICINE
Payer: MEDICAID

## 2023-08-19 ENCOUNTER — APPOINTMENT (OUTPATIENT)
Dept: GENERAL RADIOLOGY | Facility: HOSPITAL | Age: 38
End: 2023-08-19
Payer: MEDICAID

## 2023-08-19 VITALS
RESPIRATION RATE: 18 BRPM | TEMPERATURE: 97.6 F | WEIGHT: 207.45 LBS | SYSTOLIC BLOOD PRESSURE: 130 MMHG | OXYGEN SATURATION: 100 % | HEART RATE: 82 BPM | HEIGHT: 76 IN | DIASTOLIC BLOOD PRESSURE: 87 MMHG | BODY MASS INDEX: 25.26 KG/M2

## 2023-08-19 DIAGNOSIS — F41.1 ANXIETY REACTION: ICD-10-CM

## 2023-08-19 DIAGNOSIS — R06.02 SHORTNESS OF BREATH: Primary | ICD-10-CM

## 2023-08-19 LAB
ALBUMIN SERPL-MCNC: 5 G/DL (ref 3.5–5.2)
ALBUMIN/GLOB SERPL: 2.4 G/DL
ALP SERPL-CCNC: 72 U/L (ref 39–117)
ALT SERPL W P-5'-P-CCNC: 13 U/L (ref 1–41)
ANION GAP SERPL CALCULATED.3IONS-SCNC: 11.9 MMOL/L (ref 5–15)
AST SERPL-CCNC: 14 U/L (ref 1–40)
BASOPHILS # BLD AUTO: 0.05 10*3/MM3 (ref 0–0.2)
BASOPHILS NFR BLD AUTO: 0.7 % (ref 0–1.5)
BILIRUB SERPL-MCNC: 0.5 MG/DL (ref 0–1.2)
BILIRUB UR QL STRIP: NEGATIVE
BUN SERPL-MCNC: 10 MG/DL (ref 6–20)
BUN/CREAT SERPL: 10.1 (ref 7–25)
CALCIUM SPEC-SCNC: 9.3 MG/DL (ref 8.6–10.5)
CHLORIDE SERPL-SCNC: 107 MMOL/L (ref 98–107)
CLARITY UR: ABNORMAL
CO2 SERPL-SCNC: 23.1 MMOL/L (ref 22–29)
COLOR UR: YELLOW
CREAT SERPL-MCNC: 0.99 MG/DL (ref 0.76–1.27)
DEPRECATED RDW RBC AUTO: 39.8 FL (ref 37–54)
EGFRCR SERPLBLD CKD-EPI 2021: 99.4 ML/MIN/1.73
EOSINOPHIL # BLD AUTO: 0.08 10*3/MM3 (ref 0–0.4)
EOSINOPHIL NFR BLD AUTO: 1.1 % (ref 0.3–6.2)
ERYTHROCYTE [DISTWIDTH] IN BLOOD BY AUTOMATED COUNT: 13 % (ref 12.3–15.4)
FLUAV AG NPH QL: NEGATIVE
FLUBV AG NPH QL IA: NEGATIVE
GLOBULIN UR ELPH-MCNC: 2.1 GM/DL
GLUCOSE SERPL-MCNC: 117 MG/DL (ref 65–99)
GLUCOSE UR STRIP-MCNC: NEGATIVE MG/DL
HCT VFR BLD AUTO: 43.9 % (ref 37.5–51)
HGB BLD-MCNC: 15.5 G/DL (ref 13–17.7)
HGB UR QL STRIP.AUTO: NEGATIVE
HOLD SPECIMEN: NORMAL
HOLD SPECIMEN: NORMAL
IMM GRANULOCYTES # BLD AUTO: 0.02 10*3/MM3 (ref 0–0.05)
IMM GRANULOCYTES NFR BLD AUTO: 0.3 % (ref 0–0.5)
KETONES UR QL STRIP: NEGATIVE
LEUKOCYTE ESTERASE UR QL STRIP.AUTO: NEGATIVE
LYMPHOCYTES # BLD AUTO: 1.48 10*3/MM3 (ref 0.7–3.1)
LYMPHOCYTES NFR BLD AUTO: 20.9 % (ref 19.6–45.3)
MCH RBC QN AUTO: 29.8 PG (ref 26.6–33)
MCHC RBC AUTO-ENTMCNC: 35.3 G/DL (ref 31.5–35.7)
MCV RBC AUTO: 84.4 FL (ref 79–97)
MONOCYTES # BLD AUTO: 0.46 10*3/MM3 (ref 0.1–0.9)
MONOCYTES NFR BLD AUTO: 6.5 % (ref 5–12)
NEUTROPHILS NFR BLD AUTO: 4.99 10*3/MM3 (ref 1.7–7)
NEUTROPHILS NFR BLD AUTO: 70.5 % (ref 42.7–76)
NITRITE UR QL STRIP: NEGATIVE
NRBC BLD AUTO-RTO: 0 /100 WBC (ref 0–0.2)
NT-PROBNP SERPL-MCNC: <36 PG/ML (ref 0–450)
PH UR STRIP.AUTO: 6.5 [PH] (ref 5–8)
PLATELET # BLD AUTO: 255 10*3/MM3 (ref 140–450)
PMV BLD AUTO: 10.1 FL (ref 6–12)
POTASSIUM SERPL-SCNC: 3.4 MMOL/L (ref 3.5–5.2)
PROT SERPL-MCNC: 7.1 G/DL (ref 6–8.5)
PROT UR QL STRIP: ABNORMAL
RBC # BLD AUTO: 5.2 10*6/MM3 (ref 4.14–5.8)
SARS-COV-2 RNA RESP QL NAA+PROBE: NOT DETECTED
SODIUM SERPL-SCNC: 142 MMOL/L (ref 136–145)
SP GR UR STRIP: 1.03 (ref 1–1.03)
TROPONIN T SERPL HS-MCNC: <6 NG/L
UROBILINOGEN UR QL STRIP: ABNORMAL
WBC NRBC COR # BLD: 7.08 10*3/MM3 (ref 3.4–10.8)
WHOLE BLOOD HOLD COAG: NORMAL
WHOLE BLOOD HOLD SPECIMEN: NORMAL

## 2023-08-19 PROCEDURE — 93005 ELECTROCARDIOGRAM TRACING: CPT | Performed by: EMERGENCY MEDICINE

## 2023-08-19 PROCEDURE — 81003 URINALYSIS AUTO W/O SCOPE: CPT | Performed by: EMERGENCY MEDICINE

## 2023-08-19 PROCEDURE — 87804 INFLUENZA ASSAY W/OPTIC: CPT | Performed by: EMERGENCY MEDICINE

## 2023-08-19 PROCEDURE — 83880 ASSAY OF NATRIURETIC PEPTIDE: CPT | Performed by: EMERGENCY MEDICINE

## 2023-08-19 PROCEDURE — 93005 ELECTROCARDIOGRAM TRACING: CPT

## 2023-08-19 PROCEDURE — 71045 X-RAY EXAM CHEST 1 VIEW: CPT

## 2023-08-19 PROCEDURE — 84484 ASSAY OF TROPONIN QUANT: CPT | Performed by: EMERGENCY MEDICINE

## 2023-08-19 PROCEDURE — 99284 EMERGENCY DEPT VISIT MOD MDM: CPT

## 2023-08-19 PROCEDURE — 85025 COMPLETE CBC W/AUTO DIFF WBC: CPT | Performed by: EMERGENCY MEDICINE

## 2023-08-19 PROCEDURE — 80053 COMPREHEN METABOLIC PANEL: CPT | Performed by: EMERGENCY MEDICINE

## 2023-08-19 PROCEDURE — 87635 SARS-COV-2 COVID-19 AMP PRB: CPT | Performed by: EMERGENCY MEDICINE

## 2023-08-19 RX ORDER — SODIUM CHLORIDE 0.9 % (FLUSH) 0.9 %
10 SYRINGE (ML) INJECTION AS NEEDED
Status: DISCONTINUED | OUTPATIENT
Start: 2023-08-19 | End: 2023-08-19 | Stop reason: HOSPADM

## 2023-08-20 LAB — QT INTERVAL: 376 MS

## 2023-09-09 ENCOUNTER — HOSPITAL ENCOUNTER (EMERGENCY)
Facility: HOSPITAL | Age: 38
Discharge: HOME | End: 2023-09-09
Attending: EMERGENCY MEDICINE
Payer: MEDICAID

## 2023-09-09 ENCOUNTER — APPOINTMENT (OUTPATIENT)
Dept: CT IMAGING | Facility: HOSPITAL | Age: 38
End: 2023-09-09
Payer: MEDICAID

## 2023-09-09 VITALS
SYSTOLIC BLOOD PRESSURE: 121 MMHG | DIASTOLIC BLOOD PRESSURE: 80 MMHG | TEMPERATURE: 97.6 F | HEIGHT: 77 IN | HEART RATE: 79 BPM | BODY MASS INDEX: 24.99 KG/M2 | RESPIRATION RATE: 18 BRPM | WEIGHT: 211.64 LBS | OXYGEN SATURATION: 100 %

## 2023-09-09 DIAGNOSIS — N20.0 KIDNEY STONE: Primary | ICD-10-CM

## 2023-09-09 LAB
BACTERIA UR QL AUTO: ABNORMAL /HPF
BILIRUB UR QL STRIP: NEGATIVE
CLARITY UR: CLEAR
COLOR UR: YELLOW
GLUCOSE UR STRIP-MCNC: NEGATIVE MG/DL
HGB UR QL STRIP.AUTO: ABNORMAL
HYALINE CASTS UR QL AUTO: ABNORMAL /LPF
KETONES UR QL STRIP: ABNORMAL
LEUKOCYTE ESTERASE UR QL STRIP.AUTO: ABNORMAL
NITRITE UR QL STRIP: NEGATIVE
PH UR STRIP.AUTO: 6.5 [PH] (ref 5–8)
PROT UR QL STRIP: ABNORMAL
RBC # UR STRIP: ABNORMAL /HPF
REF LAB TEST METHOD: ABNORMAL
SP GR UR STRIP: 1.02 (ref 1–1.03)
SQUAMOUS #/AREA URNS HPF: ABNORMAL /HPF
UROBILINOGEN UR QL STRIP: ABNORMAL
WBC # UR STRIP: ABNORMAL /HPF

## 2023-09-09 PROCEDURE — 99284 EMERGENCY DEPT VISIT MOD MDM: CPT

## 2023-09-09 PROCEDURE — 81001 URINALYSIS AUTO W/SCOPE: CPT

## 2023-09-09 PROCEDURE — 74176 CT ABD & PELVIS W/O CONTRAST: CPT

## 2023-09-09 RX ORDER — CEPHALEXIN 250 MG/5ML
25 POWDER, FOR SUSPENSION ORAL 3 TIMES DAILY
Qty: 240 ML | Refills: 0 | Status: SHIPPED | OUTPATIENT
Start: 2023-09-09 | End: 2023-09-14

## 2023-09-09 RX ORDER — TAMSULOSIN HYDROCHLORIDE 0.4 MG/1
0.4 CAPSULE ORAL ONCE
Status: COMPLETED | OUTPATIENT
Start: 2023-09-09 | End: 2023-09-09

## 2023-09-09 RX ORDER — HYDROCODONE BITARTRATE AND ACETAMINOPHEN 10; 300 MG/15ML; MG/15ML
15 SYRUP ORAL EVERY 6 HOURS PRN
Qty: 100 ML | Refills: 0 | Status: SHIPPED | OUTPATIENT
Start: 2023-09-09

## 2023-09-09 RX ORDER — SODIUM CHLORIDE 0.9 % (FLUSH) 0.9 %
10 SYRINGE (ML) INJECTION AS NEEDED
Status: DISCONTINUED | OUTPATIENT
Start: 2023-09-09 | End: 2023-09-09 | Stop reason: HOSPADM

## 2023-09-09 RX ORDER — PHENAZOPYRIDINE HYDROCHLORIDE 100 MG/1
200 TABLET, FILM COATED ORAL ONCE
Status: COMPLETED | OUTPATIENT
Start: 2023-09-09 | End: 2023-09-09

## 2023-09-09 RX ORDER — HYDROCODONE BITARTRATE AND ACETAMINOPHEN 7.5; 325 MG/1; MG/1
1 TABLET ORAL ONCE
Status: COMPLETED | OUTPATIENT
Start: 2023-09-09 | End: 2023-09-09

## 2023-09-09 RX ADMIN — HYDROCODONE BITARTRATE AND ACETAMINOPHEN 1 TABLET: 7.5; 325 TABLET ORAL at 05:51

## 2023-09-09 RX ADMIN — PHENAZOPYRIDINE 200 MG: 100 TABLET ORAL at 05:51

## 2023-09-09 RX ADMIN — TAMSULOSIN HYDROCHLORIDE 0.4 MG: 0.4 CAPSULE ORAL at 05:51

## 2023-09-12 ENCOUNTER — HOSPITAL ENCOUNTER (OUTPATIENT)
Dept: MRI IMAGING | Facility: HOSPITAL | Age: 38
Discharge: HOME | End: 2023-09-12
Admitting: FAMILY MEDICINE
Payer: MEDICAID

## 2023-09-12 DIAGNOSIS — M25.512 ACUTE PAIN OF LEFT SHOULDER: ICD-10-CM

## 2023-09-12 PROCEDURE — 73221 MRI JOINT UPR EXTREM W/O DYE: CPT

## 2023-09-13 ENCOUNTER — TELEPHONE (OUTPATIENT)
Dept: FAMILY MEDICINE CLINIC | Facility: CLINIC | Age: 38
End: 2023-09-13

## 2023-09-13 DIAGNOSIS — S73.199A TEAR OF ACETABULAR LABRUM, UNSPECIFIED LATERALITY, INITIAL ENCOUNTER: Primary | ICD-10-CM

## 2023-09-14 ENCOUNTER — TELEPHONE (OUTPATIENT)
Dept: ORTHOPEDIC SURGERY | Facility: CLINIC | Age: 38
End: 2023-09-14
Payer: MEDICAID

## 2023-09-14 ENCOUNTER — TELEPHONE (OUTPATIENT)
Dept: ORTHOPEDIC SURGERY | Facility: CLINIC | Age: 38
End: 2023-09-14

## 2023-09-14 ENCOUNTER — OFFICE VISIT (OUTPATIENT)
Dept: ORTHOPEDIC SURGERY | Facility: CLINIC | Age: 38
End: 2023-09-14
Payer: MEDICAID

## 2023-09-14 VITALS
DIASTOLIC BLOOD PRESSURE: 80 MMHG | WEIGHT: 217 LBS | SYSTOLIC BLOOD PRESSURE: 120 MMHG | HEART RATE: 90 BPM | BODY MASS INDEX: 25.62 KG/M2 | OXYGEN SATURATION: 98 % | HEIGHT: 77 IN

## 2023-09-14 DIAGNOSIS — S43.432A SUPERIOR GLENOID LABRUM LESION OF LEFT SHOULDER, INITIAL ENCOUNTER: ICD-10-CM

## 2023-09-14 DIAGNOSIS — M25.512 LEFT SHOULDER PAIN, UNSPECIFIED CHRONICITY: Primary | ICD-10-CM

## 2023-09-14 RX ORDER — DICLOFENAC SODIUM 75 MG/1
75 TABLET, DELAYED RELEASE ORAL 2 TIMES DAILY
Qty: 60 TABLET | Refills: 1 | Status: SHIPPED | OUTPATIENT
Start: 2023-09-14

## 2023-09-19 ENCOUNTER — TELEPHONE (OUTPATIENT)
Dept: FAMILY MEDICINE CLINIC | Facility: CLINIC | Age: 38
End: 2023-09-19

## 2023-09-22 RX ORDER — HYDROCORTISONE ACETATE 25 MG/1
25 SUPPOSITORY RECTAL 2 TIMES DAILY PRN
Qty: 28 SUPPOSITORY | Refills: 0 | Status: SHIPPED | OUTPATIENT
Start: 2023-09-22 | End: 2023-09-25

## 2023-09-25 RX ORDER — HYDROCORTISONE 25 MG/G
CREAM TOPICAL 2 TIMES DAILY
Qty: 1 EACH | Refills: 0 | Status: SHIPPED | OUTPATIENT
Start: 2023-09-25 | End: 2023-09-25 | Stop reason: SDUPTHER

## 2023-09-26 RX ORDER — HYDROCORTISONE 25 MG/G
CREAM TOPICAL 2 TIMES DAILY
Qty: 1 EACH | Refills: 0 | Status: SHIPPED | OUTPATIENT
Start: 2023-09-26 | End: 2023-10-10

## 2023-09-28 ENCOUNTER — TELEPHONE (OUTPATIENT)
Dept: URGENT CARE | Facility: CLINIC | Age: 38
End: 2023-09-28
Payer: MEDICAID

## 2023-09-28 DIAGNOSIS — J01.00 ACUTE NON-RECURRENT MAXILLARY SINUSITIS: Primary | ICD-10-CM

## 2023-09-28 RX ORDER — AZITHROMYCIN 200 MG/5ML
POWDER, FOR SUSPENSION ORAL
Qty: 38 ML | Refills: 0 | Status: SHIPPED | OUTPATIENT
Start: 2023-09-28

## 2023-09-28 RX ORDER — AMOXICILLIN AND CLAVULANATE POTASSIUM 600; 42.9 MG/5ML; MG/5ML
880 POWDER, FOR SUSPENSION ORAL 2 TIMES DAILY
Qty: 146 ML | Refills: 0 | Status: SHIPPED | OUTPATIENT
Start: 2023-09-28 | End: 2023-10-08

## 2023-10-09 ENCOUNTER — OFFICE VISIT (OUTPATIENT)
Dept: FAMILY MEDICINE CLINIC | Facility: CLINIC | Age: 38
End: 2023-10-09
Payer: MEDICAID

## 2023-10-09 VITALS
BODY MASS INDEX: 25.15 KG/M2 | HEIGHT: 77 IN | OXYGEN SATURATION: 100 % | TEMPERATURE: 97.6 F | SYSTOLIC BLOOD PRESSURE: 129 MMHG | DIASTOLIC BLOOD PRESSURE: 82 MMHG | WEIGHT: 213 LBS | HEART RATE: 78 BPM

## 2023-10-09 DIAGNOSIS — Z00.00 ENCOUNTER FOR ANNUAL PHYSICAL EXAM: ICD-10-CM

## 2023-10-09 DIAGNOSIS — F41.1 GENERALIZED ANXIETY DISORDER: ICD-10-CM

## 2023-10-09 DIAGNOSIS — H61.23 BILATERAL IMPACTED CERUMEN: Primary | ICD-10-CM

## 2023-10-09 LAB
BASOPHILS # BLD AUTO: 0.05 10*3/MM3 (ref 0–0.2)
BASOPHILS NFR BLD AUTO: 0.9 % (ref 0–1.5)
DEPRECATED RDW RBC AUTO: 38.4 FL (ref 37–54)
EOSINOPHIL # BLD AUTO: 0.14 10*3/MM3 (ref 0–0.4)
EOSINOPHIL NFR BLD AUTO: 2.6 % (ref 0.3–6.2)
ERYTHROCYTE [DISTWIDTH] IN BLOOD BY AUTOMATED COUNT: 12.6 % (ref 12.3–15.4)
HCT VFR BLD AUTO: 43 % (ref 37.5–51)
HGB BLD-MCNC: 14.8 G/DL (ref 13–17.7)
IMM GRANULOCYTES # BLD AUTO: 0.02 10*3/MM3 (ref 0–0.05)
IMM GRANULOCYTES NFR BLD AUTO: 0.4 % (ref 0–0.5)
LYMPHOCYTES # BLD AUTO: 2 10*3/MM3 (ref 0.7–3.1)
LYMPHOCYTES NFR BLD AUTO: 37.6 % (ref 19.6–45.3)
MCH RBC QN AUTO: 29.4 PG (ref 26.6–33)
MCHC RBC AUTO-ENTMCNC: 34.4 G/DL (ref 31.5–35.7)
MCV RBC AUTO: 85.3 FL (ref 79–97)
MONOCYTES # BLD AUTO: 0.37 10*3/MM3 (ref 0.1–0.9)
MONOCYTES NFR BLD AUTO: 7 % (ref 5–12)
NEUTROPHILS NFR BLD AUTO: 2.74 10*3/MM3 (ref 1.7–7)
NEUTROPHILS NFR BLD AUTO: 51.5 % (ref 42.7–76)
NRBC BLD AUTO-RTO: 0 /100 WBC (ref 0–0.2)
PLATELET # BLD AUTO: 270 10*3/MM3 (ref 140–450)
PMV BLD AUTO: 10.4 FL (ref 6–12)
RBC # BLD AUTO: 5.04 10*6/MM3 (ref 4.14–5.8)
WBC NRBC COR # BLD: 5.32 10*3/MM3 (ref 3.4–10.8)

## 2023-10-09 PROCEDURE — 80053 COMPREHEN METABOLIC PANEL: CPT | Performed by: FAMILY MEDICINE

## 2023-10-09 PROCEDURE — 85025 COMPLETE CBC W/AUTO DIFF WBC: CPT | Performed by: FAMILY MEDICINE

## 2023-10-09 PROCEDURE — 84443 ASSAY THYROID STIM HORMONE: CPT | Performed by: FAMILY MEDICINE

## 2023-10-09 PROCEDURE — 80061 LIPID PANEL: CPT | Performed by: FAMILY MEDICINE

## 2023-10-10 LAB
ALBUMIN SERPL-MCNC: 4.6 G/DL (ref 3.5–5.2)
ALBUMIN/GLOB SERPL: 2.2 G/DL
ALP SERPL-CCNC: 63 U/L (ref 39–117)
ALT SERPL W P-5'-P-CCNC: 17 U/L (ref 1–41)
ANION GAP SERPL CALCULATED.3IONS-SCNC: 11.4 MMOL/L (ref 5–15)
AST SERPL-CCNC: 17 U/L (ref 1–40)
BILIRUB SERPL-MCNC: 0.3 MG/DL (ref 0–1.2)
BUN SERPL-MCNC: 9 MG/DL (ref 6–20)
BUN/CREAT SERPL: 10 (ref 7–25)
CALCIUM SPEC-SCNC: 9 MG/DL (ref 8.6–10.5)
CHLORIDE SERPL-SCNC: 105 MMOL/L (ref 98–107)
CHOLEST SERPL-MCNC: 169 MG/DL (ref 0–200)
CO2 SERPL-SCNC: 24.6 MMOL/L (ref 22–29)
CREAT SERPL-MCNC: 0.9 MG/DL (ref 0.76–1.27)
EGFRCR SERPLBLD CKD-EPI 2021: 111.4 ML/MIN/1.73
GLOBULIN UR ELPH-MCNC: 2.1 GM/DL
GLUCOSE SERPL-MCNC: 93 MG/DL (ref 65–99)
HDLC SERPL-MCNC: 43 MG/DL (ref 40–60)
LDLC SERPL CALC-MCNC: 109 MG/DL (ref 0–100)
LDLC/HDLC SERPL: 2.52 {RATIO}
POTASSIUM SERPL-SCNC: 3.6 MMOL/L (ref 3.5–5.2)
PROT SERPL-MCNC: 6.7 G/DL (ref 6–8.5)
SODIUM SERPL-SCNC: 141 MMOL/L (ref 136–145)
TRIGL SERPL-MCNC: 89 MG/DL (ref 0–150)
TSH SERPL DL<=0.05 MIU/L-ACNC: 1.64 UIU/ML (ref 0.27–4.2)
VLDLC SERPL-MCNC: 17 MG/DL (ref 5–40)

## 2023-10-23 ENCOUNTER — TELEPHONE (OUTPATIENT)
Dept: ORTHOPEDIC SURGERY | Facility: CLINIC | Age: 38
End: 2023-10-23

## 2023-10-24 ENCOUNTER — OFFICE VISIT (OUTPATIENT)
Dept: ORTHOPEDIC SURGERY | Facility: CLINIC | Age: 38
End: 2023-10-24
Payer: MEDICAID

## 2023-10-24 VITALS — BODY MASS INDEX: 25.15 KG/M2 | HEIGHT: 77 IN | WEIGHT: 213 LBS

## 2023-10-24 DIAGNOSIS — S43.432D SUPERIOR GLENOID LABRUM LESION OF LEFT SHOULDER, SUBSEQUENT ENCOUNTER: ICD-10-CM

## 2023-10-24 DIAGNOSIS — M25.512 LEFT SHOULDER PAIN, UNSPECIFIED CHRONICITY: Primary | ICD-10-CM

## 2023-10-24 PROCEDURE — 99213 OFFICE O/P EST LOW 20 MIN: CPT | Performed by: ORTHOPAEDIC SURGERY

## 2023-10-27 ENCOUNTER — TELEPHONE (OUTPATIENT)
Dept: FAMILY MEDICINE CLINIC | Facility: CLINIC | Age: 38
End: 2023-10-27
Payer: MEDICAID

## 2023-12-05 ENCOUNTER — OFFICE VISIT (OUTPATIENT)
Dept: ORTHOPEDIC SURGERY | Facility: CLINIC | Age: 38
End: 2023-12-05
Payer: MEDICAID

## 2023-12-05 VITALS — WEIGHT: 213 LBS | HEIGHT: 77 IN | BODY MASS INDEX: 25.15 KG/M2

## 2023-12-05 DIAGNOSIS — S43.432D SUPERIOR GLENOID LABRUM LESION OF LEFT SHOULDER, SUBSEQUENT ENCOUNTER: Primary | ICD-10-CM

## 2023-12-13 ENCOUNTER — OFFICE VISIT (OUTPATIENT)
Dept: FAMILY MEDICINE CLINIC | Facility: CLINIC | Age: 38
End: 2023-12-13
Payer: MEDICAID

## 2023-12-13 VITALS
OXYGEN SATURATION: 99 % | BODY MASS INDEX: 24.09 KG/M2 | DIASTOLIC BLOOD PRESSURE: 79 MMHG | HEART RATE: 88 BPM | WEIGHT: 204 LBS | SYSTOLIC BLOOD PRESSURE: 112 MMHG | TEMPERATURE: 96.2 F | HEIGHT: 77 IN

## 2023-12-13 DIAGNOSIS — S49.91XA INJURY OF RIGHT SHOULDER, INITIAL ENCOUNTER: Primary | ICD-10-CM

## 2023-12-20 ENCOUNTER — HOSPITAL ENCOUNTER (OUTPATIENT)
Dept: GENERAL RADIOLOGY | Facility: HOSPITAL | Age: 38
Discharge: HOME | End: 2023-12-20
Admitting: NURSE PRACTITIONER
Payer: MEDICAID

## 2023-12-20 DIAGNOSIS — S49.91XA INJURY OF RIGHT SHOULDER, INITIAL ENCOUNTER: ICD-10-CM

## 2023-12-20 PROCEDURE — 73030 X-RAY EXAM OF SHOULDER: CPT

## 2023-12-21 ENCOUNTER — TELEPHONE (OUTPATIENT)
Dept: FAMILY MEDICINE CLINIC | Facility: CLINIC | Age: 38
End: 2023-12-21

## 2024-01-23 ENCOUNTER — OFFICE VISIT (OUTPATIENT)
Dept: ORTHOPEDIC SURGERY | Facility: CLINIC | Age: 39
End: 2024-01-23
Payer: MEDICAID

## 2024-01-23 VITALS
HEIGHT: 77 IN | OXYGEN SATURATION: 96 % | HEART RATE: 101 BPM | DIASTOLIC BLOOD PRESSURE: 88 MMHG | WEIGHT: 210 LBS | BODY MASS INDEX: 24.79 KG/M2 | SYSTOLIC BLOOD PRESSURE: 124 MMHG

## 2024-01-23 DIAGNOSIS — M25.512 LEFT SHOULDER PAIN, UNSPECIFIED CHRONICITY: Primary | ICD-10-CM

## 2024-01-23 DIAGNOSIS — M24.612 ARTHROFIBROSIS OF LEFT SHOULDER: ICD-10-CM

## 2024-01-24 ENCOUNTER — TELEPHONE (OUTPATIENT)
Dept: ORTHOPEDIC SURGERY | Facility: CLINIC | Age: 39
End: 2024-01-24
Payer: MEDICAID

## 2024-02-08 ENCOUNTER — TELEPHONE (OUTPATIENT)
Dept: FAMILY MEDICINE CLINIC | Facility: CLINIC | Age: 39
End: 2024-02-08
Payer: MEDICAID

## 2024-02-09 ENCOUNTER — CLINICAL SUPPORT (OUTPATIENT)
Dept: FAMILY MEDICINE CLINIC | Facility: CLINIC | Age: 39
End: 2024-02-09
Payer: MEDICAID

## 2024-02-09 DIAGNOSIS — H61.23 BILATERAL IMPACTED CERUMEN: Primary | ICD-10-CM

## 2024-02-09 PROCEDURE — 69209 REMOVE IMPACTED EAR WAX UNI: CPT | Performed by: FAMILY MEDICINE

## 2024-02-12 ENCOUNTER — OFFICE VISIT (OUTPATIENT)
Dept: PODIATRY | Facility: CLINIC | Age: 39
End: 2024-02-12
Payer: MEDICAID

## 2024-02-12 VITALS
TEMPERATURE: 97.5 F | WEIGHT: 215 LBS | BODY MASS INDEX: 25.39 KG/M2 | OXYGEN SATURATION: 98 % | HEART RATE: 76 BPM | DIASTOLIC BLOOD PRESSURE: 80 MMHG | HEIGHT: 77 IN | SYSTOLIC BLOOD PRESSURE: 122 MMHG

## 2024-02-12 DIAGNOSIS — M76.62 ACHILLES BURSITIS OF LEFT LOWER EXTREMITY: Primary | ICD-10-CM

## 2024-02-12 DIAGNOSIS — M79.672 FOOT PAIN, LEFT: ICD-10-CM

## 2024-02-12 PROCEDURE — 1160F RVW MEDS BY RX/DR IN RCRD: CPT | Performed by: PODIATRIST

## 2024-02-12 PROCEDURE — 99203 OFFICE O/P NEW LOW 30 MIN: CPT | Performed by: PODIATRIST

## 2024-02-12 PROCEDURE — 1159F MED LIST DOCD IN RCRD: CPT | Performed by: PODIATRIST

## 2024-02-12 RX ORDER — DICLOFENAC SODIUM 75 MG/1
75 TABLET, DELAYED RELEASE ORAL 2 TIMES DAILY
Qty: 60 TABLET | Refills: 1 | Status: SHIPPED | OUTPATIENT
Start: 2024-02-12 | End: 2024-03-13

## 2024-02-12 RX ORDER — METHYLPREDNISOLONE 4 MG/1
TABLET ORAL
Qty: 21 TABLET | Refills: 0 | Status: SHIPPED | OUTPATIENT
Start: 2024-02-12

## 2024-02-22 ENCOUNTER — OFFICE VISIT (OUTPATIENT)
Dept: FAMILY MEDICINE CLINIC | Facility: CLINIC | Age: 39
End: 2024-02-22
Payer: MEDICAID

## 2024-02-22 VITALS
BODY MASS INDEX: 25.5 KG/M2 | OXYGEN SATURATION: 99 % | DIASTOLIC BLOOD PRESSURE: 83 MMHG | WEIGHT: 216 LBS | TEMPERATURE: 96.8 F | HEIGHT: 77 IN | SYSTOLIC BLOOD PRESSURE: 123 MMHG | HEART RATE: 102 BPM

## 2024-02-22 DIAGNOSIS — J02.9 SORE THROAT: ICD-10-CM

## 2024-02-22 DIAGNOSIS — M54.6 ACUTE RIGHT-SIDED THORACIC BACK PAIN: ICD-10-CM

## 2024-02-22 DIAGNOSIS — F41.1 GENERALIZED ANXIETY DISORDER: ICD-10-CM

## 2024-02-22 DIAGNOSIS — R09.82 POST-NASAL DRAINAGE: ICD-10-CM

## 2024-02-22 DIAGNOSIS — M54.9 BACK PAIN, UNSPECIFIED BACK LOCATION, UNSPECIFIED BACK PAIN LATERALITY, UNSPECIFIED CHRONICITY: Primary | ICD-10-CM

## 2024-02-22 LAB
BILIRUB BLD-MCNC: NEGATIVE MG/DL
CLARITY, POC: CLEAR
COLOR UR: YELLOW
EXPIRATION DATE: NORMAL
FLUAV AG UPPER RESP QL IA.RAPID: NOT DETECTED
FLUBV AG UPPER RESP QL IA.RAPID: NOT DETECTED
GLUCOSE UR STRIP-MCNC: NEGATIVE MG/DL
INTERNAL CONTROL: NORMAL
INTERNAL CONTROL: NORMAL
KETONES UR QL: NEGATIVE
LEUKOCYTE EST, POC: NEGATIVE
Lab: 8172
Lab: 9151
Lab: NORMAL
NITRITE UR-MCNC: NEGATIVE MG/ML
PH UR: 5.5 [PH] (ref 5–8)
PROT UR STRIP-MCNC: NEGATIVE MG/DL
RBC # UR STRIP: NEGATIVE /UL
S PYO AG THROAT QL: NEGATIVE
SARS-COV-2 AG UPPER RESP QL IA.RAPID: NOT DETECTED
SP GR UR: 1.01 (ref 1–1.03)
UROBILINOGEN UR QL: NORMAL

## 2024-02-22 RX ORDER — ALPRAZOLAM 0.5 MG/1
TABLET ORAL
Qty: 10 TABLET | Refills: 0 | Status: SHIPPED | OUTPATIENT
Start: 2024-02-22

## 2024-02-28 ENCOUNTER — TELEPHONE (OUTPATIENT)
Dept: ORTHOPEDIC SURGERY | Facility: CLINIC | Age: 39
End: 2024-02-28
Payer: MEDICAID

## 2024-03-06 ENCOUNTER — OFFICE VISIT (OUTPATIENT)
Dept: GASTROENTEROLOGY | Facility: CLINIC | Age: 39
End: 2024-03-06
Payer: MEDICAID

## 2024-03-06 VITALS — HEIGHT: 77 IN | BODY MASS INDEX: 25.46 KG/M2 | WEIGHT: 215.6 LBS

## 2024-03-06 DIAGNOSIS — K64.9 HEMORRHOIDS, UNSPECIFIED HEMORRHOID TYPE: ICD-10-CM

## 2024-03-06 DIAGNOSIS — K59.04 CHRONIC IDIOPATHIC CONSTIPATION: Primary | ICD-10-CM

## 2024-03-06 DIAGNOSIS — Z80.0 FH: COLON CANCER: ICD-10-CM

## 2024-03-06 PROCEDURE — 99204 OFFICE O/P NEW MOD 45 MIN: CPT | Performed by: NURSE PRACTITIONER

## 2024-03-06 PROCEDURE — 1159F MED LIST DOCD IN RCRD: CPT | Performed by: NURSE PRACTITIONER

## 2024-03-06 PROCEDURE — 1160F RVW MEDS BY RX/DR IN RCRD: CPT | Performed by: NURSE PRACTITIONER

## 2024-03-11 ENCOUNTER — OFFICE VISIT (OUTPATIENT)
Dept: UROLOGY | Facility: CLINIC | Age: 39
End: 2024-03-11
Payer: MEDICAID

## 2024-03-11 VITALS
WEIGHT: 215.8 LBS | HEART RATE: 80 BPM | BODY MASS INDEX: 25.48 KG/M2 | DIASTOLIC BLOOD PRESSURE: 87 MMHG | SYSTOLIC BLOOD PRESSURE: 122 MMHG | HEIGHT: 77 IN

## 2024-03-11 DIAGNOSIS — Z84.1 FAMILY HISTORY OF KIDNEY STONES: ICD-10-CM

## 2024-03-11 DIAGNOSIS — N20.0 KIDNEY STONE ON LEFT SIDE: Primary | ICD-10-CM

## 2024-03-11 DIAGNOSIS — N40.1 BENIGN PROSTATIC HYPERPLASIA WITH LOWER URINARY TRACT SYMPTOMS, SYMPTOM DETAILS UNSPECIFIED: ICD-10-CM

## 2024-03-11 LAB
BILIRUB BLD-MCNC: NEGATIVE MG/DL
CLARITY, POC: CLEAR
COLOR UR: YELLOW
EXPIRATION DATE: NORMAL
GLUCOSE UR STRIP-MCNC: NEGATIVE MG/DL
KETONES UR QL: NEGATIVE
LEUKOCYTE EST, POC: NEGATIVE
Lab: NORMAL
NITRITE UR-MCNC: NEGATIVE MG/ML
PH UR: 6.5 [PH] (ref 5–8)
PROT UR STRIP-MCNC: NEGATIVE MG/DL
RBC # UR STRIP: NEGATIVE /UL
SP GR UR: 1.02 (ref 1–1.03)
UROBILINOGEN UR QL: NORMAL

## 2024-03-11 PROCEDURE — 99214 OFFICE O/P EST MOD 30 MIN: CPT | Performed by: UROLOGY

## 2024-03-13 ENCOUNTER — TELEPHONE (OUTPATIENT)
Dept: UROLOGY | Facility: CLINIC | Age: 39
End: 2024-03-13
Payer: MEDICAID

## 2024-03-13 DIAGNOSIS — N40.0 BENIGN PROSTATIC HYPERPLASIA WITHOUT LOWER URINARY TRACT SYMPTOMS: Primary | ICD-10-CM

## 2024-03-14 ENCOUNTER — LAB (OUTPATIENT)
Dept: LAB | Facility: HOSPITAL | Age: 39
End: 2024-03-14
Payer: MEDICAID

## 2024-03-14 DIAGNOSIS — N40.0 BENIGN PROSTATIC HYPERPLASIA WITHOUT LOWER URINARY TRACT SYMPTOMS: ICD-10-CM

## 2024-03-14 DIAGNOSIS — N40.1 BENIGN PROSTATIC HYPERPLASIA WITH LOWER URINARY TRACT SYMPTOMS, SYMPTOM DETAILS UNSPECIFIED: ICD-10-CM

## 2024-03-14 DIAGNOSIS — N20.0 KIDNEY STONE ON LEFT SIDE: ICD-10-CM

## 2024-03-14 LAB
ALBUMIN SERPL-MCNC: 4.7 G/DL (ref 3.5–5.2)
ALBUMIN/GLOB SERPL: 2 G/DL
ALP SERPL-CCNC: 77 U/L (ref 39–117)
ALT SERPL W P-5'-P-CCNC: 17 U/L (ref 1–41)
ANION GAP SERPL CALCULATED.3IONS-SCNC: 12 MMOL/L (ref 5–15)
AST SERPL-CCNC: 14 U/L (ref 1–40)
BASOPHILS # BLD AUTO: 0.05 10*3/MM3 (ref 0–0.2)
BASOPHILS NFR BLD AUTO: 1 % (ref 0–1.5)
BILIRUB SERPL-MCNC: 0.9 MG/DL (ref 0–1.2)
BUN SERPL-MCNC: 8 MG/DL (ref 6–20)
BUN/CREAT SERPL: 7.8 (ref 7–25)
CALCIUM SPEC-SCNC: 9.1 MG/DL (ref 8.6–10.5)
CHLORIDE SERPL-SCNC: 103 MMOL/L (ref 98–107)
CO2 SERPL-SCNC: 26 MMOL/L (ref 22–29)
CREAT SERPL-MCNC: 1.03 MG/DL (ref 0.76–1.27)
DEPRECATED RDW RBC AUTO: 39.3 FL (ref 37–54)
EGFRCR SERPLBLD CKD-EPI 2021: 94.8 ML/MIN/1.73
EOSINOPHIL # BLD AUTO: 0.15 10*3/MM3 (ref 0–0.4)
EOSINOPHIL NFR BLD AUTO: 3 % (ref 0.3–6.2)
ERYTHROCYTE [DISTWIDTH] IN BLOOD BY AUTOMATED COUNT: 13 % (ref 12.3–15.4)
GLOBULIN UR ELPH-MCNC: 2.3 GM/DL
GLUCOSE SERPL-MCNC: 105 MG/DL (ref 65–99)
HCT VFR BLD AUTO: 45 % (ref 37.5–51)
HGB BLD-MCNC: 15.5 G/DL (ref 13–17.7)
IMM GRANULOCYTES # BLD AUTO: 0.01 10*3/MM3 (ref 0–0.05)
IMM GRANULOCYTES NFR BLD AUTO: 0.2 % (ref 0–0.5)
LYMPHOCYTES # BLD AUTO: 1.75 10*3/MM3 (ref 0.7–3.1)
LYMPHOCYTES NFR BLD AUTO: 35.1 % (ref 19.6–45.3)
MCH RBC QN AUTO: 28.7 PG (ref 26.6–33)
MCHC RBC AUTO-ENTMCNC: 34.4 G/DL (ref 31.5–35.7)
MCV RBC AUTO: 83.3 FL (ref 79–97)
MONOCYTES # BLD AUTO: 0.35 10*3/MM3 (ref 0.1–0.9)
MONOCYTES NFR BLD AUTO: 7 % (ref 5–12)
NEUTROPHILS NFR BLD AUTO: 2.67 10*3/MM3 (ref 1.7–7)
NEUTROPHILS NFR BLD AUTO: 53.7 % (ref 42.7–76)
NRBC BLD AUTO-RTO: 0 /100 WBC (ref 0–0.2)
PLATELET # BLD AUTO: 257 10*3/MM3 (ref 140–450)
PMV BLD AUTO: 10.5 FL (ref 6–12)
POTASSIUM SERPL-SCNC: 4.1 MMOL/L (ref 3.5–5.2)
PROT SERPL-MCNC: 7 G/DL (ref 6–8.5)
PSA SERPL-MCNC: 0.74 NG/ML (ref 0–4)
PTH-INTACT SERPL-MCNC: 54 PG/ML (ref 15–65)
RBC # BLD AUTO: 5.4 10*6/MM3 (ref 4.14–5.8)
SODIUM SERPL-SCNC: 141 MMOL/L (ref 136–145)
URATE SERPL-MCNC: 7.9 MG/DL (ref 3.4–7)
WBC NRBC COR # BLD AUTO: 4.98 10*3/MM3 (ref 3.4–10.8)

## 2024-03-14 PROCEDURE — 85025 COMPLETE CBC W/AUTO DIFF WBC: CPT

## 2024-03-14 PROCEDURE — 80053 COMPREHEN METABOLIC PANEL: CPT

## 2024-03-14 PROCEDURE — 36415 COLL VENOUS BLD VENIPUNCTURE: CPT

## 2024-03-14 PROCEDURE — 84550 ASSAY OF BLOOD/URIC ACID: CPT

## 2024-03-14 PROCEDURE — 83970 ASSAY OF PARATHORMONE: CPT

## 2024-03-14 PROCEDURE — 84153 ASSAY OF PSA TOTAL: CPT

## 2024-03-15 ENCOUNTER — TELEPHONE (OUTPATIENT)
Dept: UROLOGY | Facility: CLINIC | Age: 39
End: 2024-03-15
Payer: MEDICAID

## 2024-03-18 ENCOUNTER — TELEPHONE (OUTPATIENT)
Dept: FAMILY MEDICINE CLINIC | Facility: CLINIC | Age: 39
End: 2024-03-18
Payer: MEDICAID

## 2024-03-18 ENCOUNTER — TELEPHONE (OUTPATIENT)
Dept: UROLOGY | Facility: CLINIC | Age: 39
End: 2024-03-18
Payer: MEDICAID

## 2024-03-19 ENCOUNTER — TELEPHONE (OUTPATIENT)
Dept: ORTHOPEDIC SURGERY | Facility: CLINIC | Age: 39
End: 2024-03-19
Payer: MEDICAID

## 2024-03-19 DIAGNOSIS — Z71.3 DIETARY COUNSELING: Primary | ICD-10-CM

## 2024-03-19 RX ORDER — DICLOFENAC SODIUM 75 MG/1
75 TABLET, DELAYED RELEASE ORAL 2 TIMES DAILY
Qty: 60 TABLET | Refills: 1 | Status: SHIPPED | OUTPATIENT
Start: 2024-03-19

## 2024-03-20 ENCOUNTER — TELEPHONE (OUTPATIENT)
Dept: FAMILY MEDICINE CLINIC | Facility: CLINIC | Age: 39
End: 2024-03-20
Payer: MEDICAID

## 2024-03-25 ENCOUNTER — TELEPHONE (OUTPATIENT)
Dept: ORTHOPEDIC SURGERY | Facility: CLINIC | Age: 39
End: 2024-03-25
Payer: MEDICAID

## 2024-03-26 ENCOUNTER — TELEPHONE (OUTPATIENT)
Dept: FAMILY MEDICINE CLINIC | Facility: CLINIC | Age: 39
End: 2024-03-26

## 2024-03-26 ENCOUNTER — OFFICE VISIT (OUTPATIENT)
Dept: PODIATRY | Facility: CLINIC | Age: 39
End: 2024-03-26
Payer: MEDICAID

## 2024-03-26 ENCOUNTER — TELEPHONE (OUTPATIENT)
Dept: GASTROENTEROLOGY | Facility: CLINIC | Age: 39
End: 2024-03-26
Payer: MEDICAID

## 2024-03-26 VITALS
DIASTOLIC BLOOD PRESSURE: 73 MMHG | BODY MASS INDEX: 24.44 KG/M2 | SYSTOLIC BLOOD PRESSURE: 116 MMHG | HEIGHT: 77 IN | WEIGHT: 207 LBS | TEMPERATURE: 98.9 F | OXYGEN SATURATION: 99 % | HEART RATE: 92 BPM

## 2024-03-26 DIAGNOSIS — M76.62 ACHILLES BURSITIS OF LEFT LOWER EXTREMITY: Primary | ICD-10-CM

## 2024-03-26 DIAGNOSIS — M79.672 FOOT PAIN, LEFT: ICD-10-CM

## 2024-03-26 PROCEDURE — 1160F RVW MEDS BY RX/DR IN RCRD: CPT | Performed by: PODIATRIST

## 2024-03-26 PROCEDURE — 99213 OFFICE O/P EST LOW 20 MIN: CPT | Performed by: PODIATRIST

## 2024-03-26 PROCEDURE — 1159F MED LIST DOCD IN RCRD: CPT | Performed by: PODIATRIST

## 2024-03-26 RX ORDER — METHYLPREDNISOLONE 4 MG/1
TABLET ORAL
Qty: 21 TABLET | Refills: 0 | Status: SHIPPED | OUTPATIENT
Start: 2024-03-26

## 2024-03-26 RX ORDER — MELOXICAM 15 MG/1
15 TABLET ORAL DAILY
Qty: 30 TABLET | Refills: 1 | Status: SHIPPED | OUTPATIENT
Start: 2024-03-26

## 2024-03-27 ENCOUNTER — TELEPHONE (OUTPATIENT)
Dept: FAMILY MEDICINE CLINIC | Facility: CLINIC | Age: 39
End: 2024-03-27

## 2024-03-28 ENCOUNTER — TELEPHONE (OUTPATIENT)
Dept: FAMILY MEDICINE CLINIC | Facility: CLINIC | Age: 39
End: 2024-03-28

## 2024-03-28 ENCOUNTER — TELEPHONE (OUTPATIENT)
Dept: UROLOGY | Facility: CLINIC | Age: 39
End: 2024-03-28
Payer: MEDICAID

## 2024-04-02 ENCOUNTER — TELEPHONE (OUTPATIENT)
Dept: GASTROENTEROLOGY | Facility: CLINIC | Age: 39
End: 2024-04-02
Payer: MEDICAID

## 2024-04-02 ENCOUNTER — TELEPHONE (OUTPATIENT)
Dept: ORTHOPEDIC SURGERY | Facility: CLINIC | Age: 39
End: 2024-04-02
Payer: MEDICAID

## 2024-04-02 ENCOUNTER — OFFICE VISIT (OUTPATIENT)
Dept: FAMILY MEDICINE CLINIC | Facility: CLINIC | Age: 39
End: 2024-04-02
Payer: MEDICAID

## 2024-04-02 VITALS
BODY MASS INDEX: 24.56 KG/M2 | OXYGEN SATURATION: 98 % | DIASTOLIC BLOOD PRESSURE: 76 MMHG | TEMPERATURE: 97 F | HEART RATE: 87 BPM | HEIGHT: 77 IN | WEIGHT: 208 LBS | SYSTOLIC BLOOD PRESSURE: 123 MMHG

## 2024-04-02 DIAGNOSIS — E79.0 ELEVATED URIC ACID IN BLOOD: Primary | ICD-10-CM

## 2024-04-02 DIAGNOSIS — F41.1 GENERALIZED ANXIETY DISORDER: ICD-10-CM

## 2024-04-02 PROCEDURE — 99213 OFFICE O/P EST LOW 20 MIN: CPT | Performed by: FAMILY MEDICINE

## 2024-04-02 RX ORDER — MELOXICAM 15 MG/1
15 TABLET ORAL DAILY
COMMUNITY

## 2024-04-29 ENCOUNTER — TELEPHONE (OUTPATIENT)
Dept: DIABETES SERVICES | Facility: HOSPITAL | Age: 39
End: 2024-04-29
Payer: MEDICAID

## 2024-07-08 ENCOUNTER — OFFICE VISIT (OUTPATIENT)
Dept: NEUROLOGY | Facility: CLINIC | Age: 39
End: 2024-07-08
Payer: MEDICAID

## 2024-07-08 ENCOUNTER — PATIENT ROUNDING (BHMG ONLY) (OUTPATIENT)
Dept: NEUROLOGY | Facility: CLINIC | Age: 39
End: 2024-07-08
Payer: MEDICAID

## 2024-07-08 VITALS
BODY MASS INDEX: 25.86 KG/M2 | HEART RATE: 82 BPM | DIASTOLIC BLOOD PRESSURE: 70 MMHG | WEIGHT: 219 LBS | HEIGHT: 77 IN | SYSTOLIC BLOOD PRESSURE: 92 MMHG

## 2024-07-08 DIAGNOSIS — G25.0 ESSENTIAL TREMOR: Primary | ICD-10-CM

## 2024-07-08 PROBLEM — R25.1 TREMOR: Status: RESOLVED | Noted: 2023-06-19 | Resolved: 2024-07-08

## 2024-07-08 PROCEDURE — 99203 OFFICE O/P NEW LOW 30 MIN: CPT | Performed by: PSYCHIATRY & NEUROLOGY

## 2024-07-09 ENCOUNTER — OFFICE VISIT (OUTPATIENT)
Dept: ORTHOPEDIC SURGERY | Facility: CLINIC | Age: 39
End: 2024-07-09
Payer: MEDICAID

## 2024-07-09 VITALS
WEIGHT: 210 LBS | HEIGHT: 77 IN | DIASTOLIC BLOOD PRESSURE: 83 MMHG | BODY MASS INDEX: 24.79 KG/M2 | OXYGEN SATURATION: 96 % | HEART RATE: 79 BPM | SYSTOLIC BLOOD PRESSURE: 122 MMHG

## 2024-07-09 DIAGNOSIS — M25.562 LEFT KNEE PAIN, UNSPECIFIED CHRONICITY: Primary | ICD-10-CM

## 2024-07-09 DIAGNOSIS — M25.552 LEFT HIP PAIN: ICD-10-CM

## 2024-07-09 PROCEDURE — 99213 OFFICE O/P EST LOW 20 MIN: CPT | Performed by: ORTHOPAEDIC SURGERY

## 2024-07-11 ENCOUNTER — TELEPHONE (OUTPATIENT)
Dept: FAMILY MEDICINE CLINIC | Facility: CLINIC | Age: 39
End: 2024-07-11

## 2024-07-16 ENCOUNTER — OFFICE VISIT (OUTPATIENT)
Dept: PSYCHIATRY | Facility: CLINIC | Age: 39
End: 2024-07-16
Payer: MEDICAID

## 2024-07-16 VITALS
WEIGHT: 219.4 LBS | BODY MASS INDEX: 25.91 KG/M2 | SYSTOLIC BLOOD PRESSURE: 117 MMHG | DIASTOLIC BLOOD PRESSURE: 75 MMHG | HEART RATE: 84 BPM | HEIGHT: 77 IN

## 2024-07-16 DIAGNOSIS — F41.9 ANXIETY DISORDER, UNSPECIFIED TYPE: ICD-10-CM

## 2024-07-16 DIAGNOSIS — R68.89 SUSPECTED AUTISM DISORDER: ICD-10-CM

## 2024-07-16 DIAGNOSIS — F51.5 NIGHTMARES: ICD-10-CM

## 2024-07-16 DIAGNOSIS — F42.2 MIXED OBSESSIONAL THOUGHTS AND ACTS: Primary | ICD-10-CM

## 2024-07-16 PROCEDURE — 90792 PSYCH DIAG EVAL W/MED SRVCS: CPT

## 2024-07-16 PROCEDURE — 1160F RVW MEDS BY RX/DR IN RCRD: CPT

## 2024-07-16 PROCEDURE — 1159F MED LIST DOCD IN RCRD: CPT

## 2024-08-01 ENCOUNTER — APPOINTMENT (OUTPATIENT)
Dept: CT IMAGING | Facility: HOSPITAL | Age: 39
End: 2024-08-01
Payer: MEDICAID

## 2024-08-01 ENCOUNTER — HOSPITAL ENCOUNTER (EMERGENCY)
Facility: HOSPITAL | Age: 39
Discharge: HOME | End: 2024-08-01
Attending: EMERGENCY MEDICINE
Payer: MEDICAID

## 2024-08-01 VITALS
BODY MASS INDEX: 25.87 KG/M2 | TEMPERATURE: 97.3 F | SYSTOLIC BLOOD PRESSURE: 111 MMHG | HEART RATE: 82 BPM | HEIGHT: 77 IN | OXYGEN SATURATION: 100 % | RESPIRATION RATE: 20 BRPM | WEIGHT: 219.14 LBS | DIASTOLIC BLOOD PRESSURE: 88 MMHG

## 2024-08-01 DIAGNOSIS — N20.1 URETEROLITHIASIS: Primary | ICD-10-CM

## 2024-08-01 LAB
ALBUMIN SERPL-MCNC: 4.6 G/DL (ref 3.5–5.2)
ALBUMIN/GLOB SERPL: 1.8 G/DL
ALP SERPL-CCNC: 64 U/L (ref 39–117)
ALT SERPL W P-5'-P-CCNC: 13 U/L (ref 1–41)
ANION GAP SERPL CALCULATED.3IONS-SCNC: 12.3 MMOL/L (ref 5–15)
AST SERPL-CCNC: 16 U/L (ref 1–40)
BACTERIA UR QL AUTO: ABNORMAL /HPF
BASOPHILS # BLD AUTO: 0.05 10*3/MM3 (ref 0–0.2)
BASOPHILS NFR BLD AUTO: 0.9 % (ref 0–1.5)
BILIRUB SERPL-MCNC: 0.8 MG/DL (ref 0–1.2)
BILIRUB UR QL STRIP: NEGATIVE
BUN SERPL-MCNC: 15 MG/DL (ref 6–20)
BUN/CREAT SERPL: 14.3 (ref 7–25)
CALCIUM SPEC-SCNC: 9.2 MG/DL (ref 8.6–10.5)
CHLORIDE SERPL-SCNC: 103 MMOL/L (ref 98–107)
CLARITY UR: ABNORMAL
CO2 SERPL-SCNC: 26.7 MMOL/L (ref 22–29)
COLOR UR: ABNORMAL
CREAT SERPL-MCNC: 1.05 MG/DL (ref 0.76–1.27)
DEPRECATED RDW RBC AUTO: 39.8 FL (ref 37–54)
EGFRCR SERPLBLD CKD-EPI 2021: 92 ML/MIN/1.73
EOSINOPHIL # BLD AUTO: 0.1 10*3/MM3 (ref 0–0.4)
EOSINOPHIL NFR BLD AUTO: 1.8 % (ref 0.3–6.2)
ERYTHROCYTE [DISTWIDTH] IN BLOOD BY AUTOMATED COUNT: 13.2 % (ref 12.3–15.4)
GLOBULIN UR ELPH-MCNC: 2.5 GM/DL
GLUCOSE SERPL-MCNC: 121 MG/DL (ref 65–99)
GLUCOSE UR STRIP-MCNC: NEGATIVE MG/DL
HCT VFR BLD AUTO: 43.9 % (ref 37.5–51)
HGB BLD-MCNC: 15.5 G/DL (ref 13–17.7)
HGB UR QL STRIP.AUTO: ABNORMAL
HOLD SPECIMEN: NORMAL
HOLD SPECIMEN: NORMAL
HYALINE CASTS UR QL AUTO: ABNORMAL /LPF
IMM GRANULOCYTES # BLD AUTO: 0.02 10*3/MM3 (ref 0–0.05)
IMM GRANULOCYTES NFR BLD AUTO: 0.4 % (ref 0–0.5)
KETONES UR QL STRIP: NEGATIVE
LEUKOCYTE ESTERASE UR QL STRIP.AUTO: ABNORMAL
LIPASE SERPL-CCNC: 29 U/L (ref 13–60)
LYMPHOCYTES # BLD AUTO: 2.14 10*3/MM3 (ref 0.7–3.1)
LYMPHOCYTES NFR BLD AUTO: 37.9 % (ref 19.6–45.3)
MCH RBC QN AUTO: 29.4 PG (ref 26.6–33)
MCHC RBC AUTO-ENTMCNC: 35.3 G/DL (ref 31.5–35.7)
MCV RBC AUTO: 83.1 FL (ref 79–97)
MONOCYTES # BLD AUTO: 0.39 10*3/MM3 (ref 0.1–0.9)
MONOCYTES NFR BLD AUTO: 6.9 % (ref 5–12)
NEUTROPHILS NFR BLD AUTO: 2.94 10*3/MM3 (ref 1.7–7)
NEUTROPHILS NFR BLD AUTO: 52.1 % (ref 42.7–76)
NITRITE UR QL STRIP: NEGATIVE
NRBC BLD AUTO-RTO: 0 /100 WBC (ref 0–0.2)
PH UR STRIP.AUTO: 5.5 [PH] (ref 5–8)
PLATELET # BLD AUTO: 245 10*3/MM3 (ref 140–450)
PMV BLD AUTO: 9.7 FL (ref 6–12)
POTASSIUM SERPL-SCNC: 3.5 MMOL/L (ref 3.5–5.2)
PROT SERPL-MCNC: 7.1 G/DL (ref 6–8.5)
PROT UR QL STRIP: ABNORMAL
RBC # BLD AUTO: 5.28 10*6/MM3 (ref 4.14–5.8)
RBC # UR STRIP: ABNORMAL /HPF
REF LAB TEST METHOD: ABNORMAL
SODIUM SERPL-SCNC: 142 MMOL/L (ref 136–145)
SP GR UR STRIP: >1.03 (ref 1–1.03)
SQUAMOUS #/AREA URNS HPF: ABNORMAL /HPF
UROBILINOGEN UR QL STRIP: ABNORMAL
WBC # UR STRIP: ABNORMAL /HPF
WBC NRBC COR # BLD AUTO: 5.64 10*3/MM3 (ref 3.4–10.8)
WHOLE BLOOD HOLD COAG: NORMAL
WHOLE BLOOD HOLD SPECIMEN: NORMAL

## 2024-08-01 PROCEDURE — 25010000002 ONDANSETRON PER 1 MG: Performed by: REGISTERED NURSE

## 2024-08-01 PROCEDURE — 25010000002 KETOROLAC TROMETHAMINE PER 15 MG: Performed by: REGISTERED NURSE

## 2024-08-01 PROCEDURE — 80053 COMPREHEN METABOLIC PANEL: CPT

## 2024-08-01 PROCEDURE — 99284 EMERGENCY DEPT VISIT MOD MDM: CPT

## 2024-08-01 PROCEDURE — 96375 TX/PRO/DX INJ NEW DRUG ADDON: CPT

## 2024-08-01 PROCEDURE — 85025 COMPLETE CBC W/AUTO DIFF WBC: CPT

## 2024-08-01 PROCEDURE — 83690 ASSAY OF LIPASE: CPT

## 2024-08-01 PROCEDURE — 74176 CT ABD & PELVIS W/O CONTRAST: CPT

## 2024-08-01 PROCEDURE — 36415 COLL VENOUS BLD VENIPUNCTURE: CPT

## 2024-08-01 PROCEDURE — 96374 THER/PROPH/DIAG INJ IV PUSH: CPT

## 2024-08-01 PROCEDURE — 81001 URINALYSIS AUTO W/SCOPE: CPT

## 2024-08-01 RX ORDER — ONDANSETRON 4 MG/1
8 TABLET, ORALLY DISINTEGRATING ORAL EVERY 8 HOURS PRN
Qty: 15 TABLET | Refills: 0 | Status: SHIPPED | OUTPATIENT
Start: 2024-08-01 | End: 2024-08-01

## 2024-08-01 RX ORDER — ONDANSETRON 4 MG/1
8 TABLET, ORALLY DISINTEGRATING ORAL EVERY 8 HOURS PRN
Qty: 15 TABLET | Refills: 0 | Status: SHIPPED | OUTPATIENT
Start: 2024-08-01 | End: 2024-08-06

## 2024-08-01 RX ORDER — KETOROLAC TROMETHAMINE 15 MG/ML
15 INJECTION, SOLUTION INTRAMUSCULAR; INTRAVENOUS ONCE
Status: COMPLETED | OUTPATIENT
Start: 2024-08-01 | End: 2024-08-01

## 2024-08-01 RX ORDER — IBUPROFEN 600 MG/1
600 TABLET ORAL EVERY 8 HOURS PRN
Qty: 21 TABLET | Refills: 0 | Status: SHIPPED | OUTPATIENT
Start: 2024-08-01 | End: 2024-08-01

## 2024-08-01 RX ORDER — IBUPROFEN 600 MG/1
600 TABLET ORAL EVERY 8 HOURS PRN
Qty: 21 TABLET | Refills: 0 | Status: SHIPPED | OUTPATIENT
Start: 2024-08-01 | End: 2024-08-08

## 2024-08-01 RX ORDER — SODIUM CHLORIDE 0.9 % (FLUSH) 0.9 %
10 SYRINGE (ML) INJECTION AS NEEDED
Status: DISCONTINUED | OUTPATIENT
Start: 2024-08-01 | End: 2024-08-02 | Stop reason: HOSPADM

## 2024-08-01 RX ORDER — ONDANSETRON 2 MG/ML
4 INJECTION INTRAMUSCULAR; INTRAVENOUS ONCE
Status: COMPLETED | OUTPATIENT
Start: 2024-08-01 | End: 2024-08-01

## 2024-08-01 RX ADMIN — KETOROLAC TROMETHAMINE 15 MG: 15 INJECTION, SOLUTION INTRAMUSCULAR; INTRAVENOUS at 19:57

## 2024-08-01 RX ADMIN — ONDANSETRON 4 MG: 2 INJECTION INTRAMUSCULAR; INTRAVENOUS at 19:58

## 2024-08-02 ENCOUNTER — TELEPHONE (OUTPATIENT)
Dept: FAMILY MEDICINE CLINIC | Facility: CLINIC | Age: 39
End: 2024-08-02

## 2024-08-02 RX ORDER — TAMSULOSIN HYDROCHLORIDE 0.4 MG/1
1 CAPSULE ORAL DAILY
Qty: 14 CAPSULE | Refills: 0 | Status: SHIPPED | OUTPATIENT
Start: 2024-08-02

## 2024-08-12 ENCOUNTER — OFFICE VISIT (OUTPATIENT)
Dept: UROLOGY | Facility: CLINIC | Age: 39
End: 2024-08-12
Payer: MEDICAID

## 2024-08-12 ENCOUNTER — TELEPHONE (OUTPATIENT)
Dept: FAMILY MEDICINE CLINIC | Facility: CLINIC | Age: 39
End: 2024-08-12

## 2024-08-12 VITALS
HEIGHT: 77 IN | WEIGHT: 221 LBS | TEMPERATURE: 98.6 F | DIASTOLIC BLOOD PRESSURE: 82 MMHG | SYSTOLIC BLOOD PRESSURE: 141 MMHG | BODY MASS INDEX: 26.09 KG/M2 | HEART RATE: 87 BPM

## 2024-08-12 DIAGNOSIS — N13.8 BPH WITH OBSTRUCTION/LOWER URINARY TRACT SYMPTOMS: ICD-10-CM

## 2024-08-12 DIAGNOSIS — N20.1 LEFT URETERAL CALCULUS: ICD-10-CM

## 2024-08-12 DIAGNOSIS — N20.0 KIDNEY STONE ON LEFT SIDE: Primary | ICD-10-CM

## 2024-08-12 DIAGNOSIS — Z84.1 FAMILY HISTORY OF KIDNEY STONES: ICD-10-CM

## 2024-08-12 DIAGNOSIS — Z87.442 HISTORY OF KIDNEY STONES: ICD-10-CM

## 2024-08-12 DIAGNOSIS — N40.1 BPH WITH OBSTRUCTION/LOWER URINARY TRACT SYMPTOMS: ICD-10-CM

## 2024-08-12 LAB
BACTERIA UR QL AUTO: NORMAL /HPF
BILIRUB BLD-MCNC: NEGATIVE MG/DL
CLARITY, POC: CLEAR
COLOR UR: ABNORMAL
EPI CELLS #/AREA URNS HPF: 0 /[HPF]
EXPIRATION DATE: ABNORMAL
GLUCOSE UR STRIP-MCNC: NEGATIVE MG/DL
KETONES UR QL: NEGATIVE
LEUKOCYTE EST, POC: NEGATIVE
Lab: ABNORMAL
NITRITE UR-MCNC: NEGATIVE MG/ML
PH UR: 6.5 [PH] (ref 5–8)
PROT UR STRIP-MCNC: NEGATIVE MG/DL
RBC # UR STRIP: ABNORMAL /UL
RBC # UR STRIP: NORMAL /HPF
RENAL EPITHELIAL, POC: 0
SP GR UR: 1.03 (ref 1–1.03)
UNIDENT CRYS URNS QL MICRO: NORMAL /HPF
UROBILINOGEN UR QL: NORMAL
WBC # UR STRIP: NORMAL /HPF

## 2024-08-12 PROCEDURE — 99214 OFFICE O/P EST MOD 30 MIN: CPT | Performed by: UROLOGY

## 2024-08-12 PROCEDURE — 1159F MED LIST DOCD IN RCRD: CPT | Performed by: UROLOGY

## 2024-08-12 PROCEDURE — 1160F RVW MEDS BY RX/DR IN RCRD: CPT | Performed by: UROLOGY

## 2024-08-13 ENCOUNTER — TELEPHONE (OUTPATIENT)
Dept: UROLOGY | Facility: CLINIC | Age: 39
End: 2024-08-13

## 2024-08-13 DIAGNOSIS — R16.1 SPLENOMEGALY: Primary | ICD-10-CM

## 2024-08-19 ENCOUNTER — LAB (OUTPATIENT)
Dept: LAB | Facility: HOSPITAL | Age: 39
End: 2024-08-19
Payer: MEDICAID

## 2024-08-19 DIAGNOSIS — N20.0 KIDNEY STONE ON LEFT SIDE: ICD-10-CM

## 2024-08-19 DIAGNOSIS — N20.1 LEFT URETERAL CALCULUS: ICD-10-CM

## 2024-08-19 DIAGNOSIS — Z84.1 FAMILY HISTORY OF KIDNEY STONES: ICD-10-CM

## 2024-08-19 DIAGNOSIS — Z87.442 HISTORY OF KIDNEY STONES: ICD-10-CM

## 2024-08-19 DIAGNOSIS — R16.1 SPLENOMEGALY: ICD-10-CM

## 2024-08-19 LAB
BASOPHILS # BLD AUTO: 0.05 10*3/MM3 (ref 0–0.2)
BASOPHILS NFR BLD AUTO: 1.1 % (ref 0–1.5)
DEPRECATED RDW RBC AUTO: 39.7 FL (ref 37–54)
EOSINOPHIL # BLD AUTO: 0.18 10*3/MM3 (ref 0–0.4)
EOSINOPHIL NFR BLD AUTO: 4 % (ref 0.3–6.2)
ERYTHROCYTE [DISTWIDTH] IN BLOOD BY AUTOMATED COUNT: 12.8 % (ref 12.3–15.4)
HCT VFR BLD AUTO: 43.4 % (ref 37.5–51)
HETEROPH AB SER QL LA: NEGATIVE
HGB BLD-MCNC: 14.9 G/DL (ref 13–17.7)
IMM GRANULOCYTES # BLD AUTO: 0.01 10*3/MM3 (ref 0–0.05)
IMM GRANULOCYTES NFR BLD AUTO: 0.2 % (ref 0–0.5)
LYMPHOCYTES # BLD AUTO: 1.67 10*3/MM3 (ref 0.7–3.1)
LYMPHOCYTES NFR BLD AUTO: 37.3 % (ref 19.6–45.3)
MCH RBC QN AUTO: 29.6 PG (ref 26.6–33)
MCHC RBC AUTO-ENTMCNC: 34.3 G/DL (ref 31.5–35.7)
MCV RBC AUTO: 86.3 FL (ref 79–97)
MONOCYTES # BLD AUTO: 0.34 10*3/MM3 (ref 0.1–0.9)
MONOCYTES NFR BLD AUTO: 7.6 % (ref 5–12)
NEUTROPHILS NFR BLD AUTO: 2.23 10*3/MM3 (ref 1.7–7)
NEUTROPHILS NFR BLD AUTO: 49.8 % (ref 42.7–76)
NRBC BLD AUTO-RTO: 0 /100 WBC (ref 0–0.2)
PLATELET # BLD AUTO: 238 10*3/MM3 (ref 140–450)
PMV BLD AUTO: 10.2 FL (ref 6–12)
PTH-INTACT SERPL-MCNC: 57.7 PG/ML (ref 15–65)
RBC # BLD AUTO: 5.03 10*6/MM3 (ref 4.14–5.8)
URATE SERPL-MCNC: 6 MG/DL (ref 3.4–7)
WBC NRBC COR # BLD AUTO: 4.48 10*3/MM3 (ref 3.4–10.8)

## 2024-08-19 PROCEDURE — 83970 ASSAY OF PARATHORMONE: CPT

## 2024-08-19 PROCEDURE — 36415 COLL VENOUS BLD VENIPUNCTURE: CPT

## 2024-08-19 PROCEDURE — 84550 ASSAY OF BLOOD/URIC ACID: CPT

## 2024-08-19 PROCEDURE — 86308 HETEROPHILE ANTIBODY SCREEN: CPT

## 2024-08-19 PROCEDURE — 85025 COMPLETE CBC W/AUTO DIFF WBC: CPT | Performed by: FAMILY MEDICINE

## 2024-08-20 ENCOUNTER — TELEPHONE (OUTPATIENT)
Dept: FAMILY MEDICINE CLINIC | Facility: CLINIC | Age: 39
End: 2024-08-20

## 2024-08-20 DIAGNOSIS — R16.1 SPLEEN ENLARGED: Primary | ICD-10-CM

## 2024-08-20 DIAGNOSIS — R73.09 ELEVATED GLUCOSE: Primary | ICD-10-CM

## 2024-08-23 ENCOUNTER — TELEPHONE (OUTPATIENT)
Dept: UROLOGY | Facility: CLINIC | Age: 39
End: 2024-08-23
Payer: MEDICAID

## 2024-08-26 ENCOUNTER — TELEPHONE (OUTPATIENT)
Dept: FAMILY MEDICINE CLINIC | Facility: CLINIC | Age: 39
End: 2024-08-26

## 2024-08-29 ENCOUNTER — HOSPITAL ENCOUNTER (OUTPATIENT)
Dept: CT IMAGING | Facility: HOSPITAL | Age: 39
Discharge: HOME | End: 2024-08-29
Payer: MEDICAID

## 2024-08-29 ENCOUNTER — LAB (OUTPATIENT)
Dept: LAB | Facility: HOSPITAL | Age: 39
End: 2024-08-29
Payer: MEDICAID

## 2024-08-29 DIAGNOSIS — N20.1 LEFT URETERAL CALCULUS: ICD-10-CM

## 2024-08-29 DIAGNOSIS — Z84.1 FAMILY HISTORY OF KIDNEY STONES: ICD-10-CM

## 2024-08-29 DIAGNOSIS — N20.0 KIDNEY STONE ON LEFT SIDE: ICD-10-CM

## 2024-08-29 DIAGNOSIS — R73.09 ELEVATED GLUCOSE: ICD-10-CM

## 2024-08-29 PROCEDURE — 74176 CT ABD & PELVIS W/O CONTRAST: CPT

## 2024-08-29 PROCEDURE — 36415 COLL VENOUS BLD VENIPUNCTURE: CPT

## 2024-08-29 PROCEDURE — 83036 HEMOGLOBIN GLYCOSYLATED A1C: CPT

## 2024-08-30 ENCOUNTER — TELEPHONE (OUTPATIENT)
Dept: UROLOGY | Facility: CLINIC | Age: 39
End: 2024-08-30

## 2024-08-30 LAB — HBA1C MFR BLD: 4.9 % (ref 4.8–5.6)

## 2024-09-03 ENCOUNTER — TELEPHONE (OUTPATIENT)
Dept: ONCOLOGY | Facility: HOSPITAL | Age: 39
End: 2024-09-03

## 2024-09-03 ENCOUNTER — TELEPHONE (OUTPATIENT)
Dept: FAMILY MEDICINE CLINIC | Facility: CLINIC | Age: 39
End: 2024-09-03

## 2024-09-03 ENCOUNTER — TELEPHONE (OUTPATIENT)
Dept: ONCOLOGY | Facility: HOSPITAL | Age: 39
End: 2024-09-03
Payer: MEDICAID

## 2024-09-07 PROCEDURE — 87635 SARS-COV-2 COVID-19 AMP PRB: CPT | Performed by: NURSE PRACTITIONER

## 2024-09-08 ENCOUNTER — HOSPITAL ENCOUNTER (EMERGENCY)
Facility: HOSPITAL | Age: 39
Discharge: HOME | End: 2024-09-08
Attending: EMERGENCY MEDICINE | Admitting: EMERGENCY MEDICINE
Payer: MEDICAID

## 2024-09-08 ENCOUNTER — TELEPHONE (OUTPATIENT)
Dept: URGENT CARE | Facility: CLINIC | Age: 39
End: 2024-09-08
Payer: MEDICAID

## 2024-09-08 VITALS
HEART RATE: 82 BPM | HEIGHT: 77 IN | SYSTOLIC BLOOD PRESSURE: 144 MMHG | DIASTOLIC BLOOD PRESSURE: 83 MMHG | OXYGEN SATURATION: 98 % | TEMPERATURE: 98 F | RESPIRATION RATE: 17 BRPM | BODY MASS INDEX: 25.69 KG/M2 | WEIGHT: 217.59 LBS

## 2024-09-08 DIAGNOSIS — R51.9 ACUTE NONINTRACTABLE HEADACHE, UNSPECIFIED HEADACHE TYPE: Primary | ICD-10-CM

## 2024-09-08 PROCEDURE — 99282 EMERGENCY DEPT VISIT SF MDM: CPT

## 2024-09-09 ENCOUNTER — OFFICE VISIT (OUTPATIENT)
Dept: FAMILY MEDICINE CLINIC | Facility: CLINIC | Age: 39
End: 2024-09-09
Payer: MEDICAID

## 2024-09-09 ENCOUNTER — TELEPHONE (OUTPATIENT)
Dept: NEUROLOGY | Facility: CLINIC | Age: 39
End: 2024-09-09
Payer: MEDICAID

## 2024-09-09 VITALS
WEIGHT: 217.8 LBS | TEMPERATURE: 99.5 F | HEART RATE: 77 BPM | SYSTOLIC BLOOD PRESSURE: 129 MMHG | RESPIRATION RATE: 18 BRPM | BODY MASS INDEX: 25.72 KG/M2 | OXYGEN SATURATION: 99 % | HEIGHT: 77 IN | DIASTOLIC BLOOD PRESSURE: 86 MMHG

## 2024-09-09 DIAGNOSIS — R51.9 ACUTE INTRACTABLE HEADACHE, UNSPECIFIED HEADACHE TYPE: Primary | ICD-10-CM

## 2024-09-09 PROCEDURE — 1159F MED LIST DOCD IN RCRD: CPT | Performed by: STUDENT IN AN ORGANIZED HEALTH CARE EDUCATION/TRAINING PROGRAM

## 2024-09-09 PROCEDURE — 1125F AMNT PAIN NOTED PAIN PRSNT: CPT | Performed by: STUDENT IN AN ORGANIZED HEALTH CARE EDUCATION/TRAINING PROGRAM

## 2024-09-09 PROCEDURE — 1160F RVW MEDS BY RX/DR IN RCRD: CPT | Performed by: STUDENT IN AN ORGANIZED HEALTH CARE EDUCATION/TRAINING PROGRAM

## 2024-09-09 PROCEDURE — 99215 OFFICE O/P EST HI 40 MIN: CPT | Performed by: STUDENT IN AN ORGANIZED HEALTH CARE EDUCATION/TRAINING PROGRAM

## 2024-09-10 ENCOUNTER — TELEPHONE (OUTPATIENT)
Dept: FAMILY MEDICINE CLINIC | Facility: CLINIC | Age: 39
End: 2024-09-10
Payer: MEDICAID

## 2024-09-10 ENCOUNTER — HOSPITAL ENCOUNTER (OUTPATIENT)
Dept: MRI IMAGING | Facility: HOSPITAL | Age: 39
Discharge: HOME | End: 2024-09-10
Admitting: STUDENT IN AN ORGANIZED HEALTH CARE EDUCATION/TRAINING PROGRAM
Payer: MEDICAID

## 2024-09-10 PROCEDURE — 70551 MRI BRAIN STEM W/O DYE: CPT

## 2024-09-11 ENCOUNTER — OFFICE VISIT (OUTPATIENT)
Dept: FAMILY MEDICINE CLINIC | Facility: CLINIC | Age: 39
End: 2024-09-11
Payer: MEDICAID

## 2024-09-11 VITALS
DIASTOLIC BLOOD PRESSURE: 71 MMHG | TEMPERATURE: 99.6 F | HEART RATE: 85 BPM | BODY MASS INDEX: 25.5 KG/M2 | RESPIRATION RATE: 18 BRPM | HEIGHT: 77 IN | OXYGEN SATURATION: 99 % | WEIGHT: 216 LBS | SYSTOLIC BLOOD PRESSURE: 123 MMHG

## 2024-09-11 DIAGNOSIS — R51.9 CHRONIC NONINTRACTABLE HEADACHE, UNSPECIFIED HEADACHE TYPE: Primary | ICD-10-CM

## 2024-09-11 DIAGNOSIS — G89.29 CHRONIC NONINTRACTABLE HEADACHE, UNSPECIFIED HEADACHE TYPE: Primary | ICD-10-CM

## 2024-09-11 DIAGNOSIS — B02.9 HERPES ZOSTER WITHOUT COMPLICATION: Primary | ICD-10-CM

## 2024-09-11 PROCEDURE — 1125F AMNT PAIN NOTED PAIN PRSNT: CPT | Performed by: STUDENT IN AN ORGANIZED HEALTH CARE EDUCATION/TRAINING PROGRAM

## 2024-09-11 PROCEDURE — 99214 OFFICE O/P EST MOD 30 MIN: CPT | Performed by: STUDENT IN AN ORGANIZED HEALTH CARE EDUCATION/TRAINING PROGRAM

## 2024-09-11 PROCEDURE — 1159F MED LIST DOCD IN RCRD: CPT | Performed by: STUDENT IN AN ORGANIZED HEALTH CARE EDUCATION/TRAINING PROGRAM

## 2024-09-11 PROCEDURE — 1160F RVW MEDS BY RX/DR IN RCRD: CPT | Performed by: STUDENT IN AN ORGANIZED HEALTH CARE EDUCATION/TRAINING PROGRAM

## 2024-09-11 RX ORDER — PREDNISONE 50 MG/1
50 TABLET ORAL DAILY
Qty: 5 TABLET | Refills: 0 | Status: SHIPPED | OUTPATIENT
Start: 2024-09-11

## 2024-09-11 RX ORDER — ACYCLOVIR 200 MG/5ML
900 SUSPENSION ORAL
Qty: 1000 ML | Refills: 0 | Status: SHIPPED | OUTPATIENT
Start: 2024-09-11

## 2024-09-11 RX ORDER — VALACYCLOVIR HYDROCHLORIDE 1 G/1
1000 TABLET, FILM COATED ORAL 2 TIMES DAILY
Qty: 20 TABLET | Refills: 0 | Status: SHIPPED | OUTPATIENT
Start: 2024-09-11 | End: 2024-09-21

## 2024-09-13 ENCOUNTER — TELEPHONE (OUTPATIENT)
Dept: FAMILY MEDICINE CLINIC | Facility: CLINIC | Age: 39
End: 2024-09-13
Payer: MEDICAID

## 2024-09-13 DIAGNOSIS — E11.43 TYPE 2 DIABETES MELLITUS WITH DIABETIC AUTONOMIC NEUROPATHY, WITHOUT LONG-TERM CURRENT USE OF INSULIN: Primary | ICD-10-CM

## 2024-09-13 RX ORDER — GABAPENTIN 100 MG/1
100 CAPSULE ORAL 3 TIMES DAILY
Qty: 30 CAPSULE | Refills: 0 | Status: SHIPPED | OUTPATIENT
Start: 2024-09-13 | End: 2024-09-23

## 2024-09-16 ENCOUNTER — OFFICE VISIT (OUTPATIENT)
Dept: FAMILY MEDICINE CLINIC | Facility: CLINIC | Age: 39
End: 2024-09-16
Payer: MEDICAID

## 2024-09-16 ENCOUNTER — TELEPHONE (OUTPATIENT)
Dept: UROLOGY | Facility: CLINIC | Age: 39
End: 2024-09-16

## 2024-09-16 VITALS
HEIGHT: 77 IN | SYSTOLIC BLOOD PRESSURE: 132 MMHG | TEMPERATURE: 97.2 F | DIASTOLIC BLOOD PRESSURE: 81 MMHG | BODY MASS INDEX: 26.09 KG/M2 | WEIGHT: 221 LBS

## 2024-09-16 DIAGNOSIS — B02.9 HERPES ZOSTER WITHOUT COMPLICATION: Primary | ICD-10-CM

## 2024-09-16 DIAGNOSIS — N20.0 KIDNEY STONE: ICD-10-CM

## 2024-09-16 LAB
BILIRUB BLD-MCNC: NEGATIVE MG/DL
CLARITY, POC: CLEAR
COLOR UR: YELLOW
EXPIRATION DATE: ABNORMAL
GLUCOSE UR STRIP-MCNC: NEGATIVE MG/DL
KETONES UR QL: NEGATIVE
LEUKOCYTE EST, POC: NEGATIVE
Lab: ABNORMAL
NITRITE UR-MCNC: NEGATIVE MG/ML
PH UR: 6 [PH] (ref 5–8)
PROT UR STRIP-MCNC: ABNORMAL MG/DL
RBC # UR STRIP: NEGATIVE /UL
SP GR UR: 1.02 (ref 1–1.03)
UROBILINOGEN UR QL: ABNORMAL

## 2024-09-16 PROCEDURE — 3044F HG A1C LEVEL LT 7.0%: CPT | Performed by: FAMILY MEDICINE

## 2024-09-16 PROCEDURE — 1125F AMNT PAIN NOTED PAIN PRSNT: CPT | Performed by: FAMILY MEDICINE

## 2024-09-16 PROCEDURE — 99213 OFFICE O/P EST LOW 20 MIN: CPT | Performed by: FAMILY MEDICINE

## 2024-09-24 ENCOUNTER — TELEPHONE (OUTPATIENT)
Dept: FAMILY MEDICINE CLINIC | Facility: CLINIC | Age: 39
End: 2024-09-24
Payer: MEDICAID

## 2024-10-03 ENCOUNTER — TELEPHONE (OUTPATIENT)
Dept: ONCOLOGY | Facility: HOSPITAL | Age: 39
End: 2024-10-03
Payer: MEDICAID

## 2024-10-09 ENCOUNTER — OFFICE VISIT (OUTPATIENT)
Dept: PODIATRY | Facility: CLINIC | Age: 39
End: 2024-10-09
Payer: MEDICAID

## 2024-10-09 ENCOUNTER — CONSULT (OUTPATIENT)
Dept: ONCOLOGY | Facility: HOSPITAL | Age: 39
End: 2024-10-09
Payer: MEDICAID

## 2024-10-09 VITALS
WEIGHT: 220 LBS | BODY MASS INDEX: 25.98 KG/M2 | OXYGEN SATURATION: 98 % | SYSTOLIC BLOOD PRESSURE: 109 MMHG | TEMPERATURE: 98 F | HEIGHT: 77 IN | DIASTOLIC BLOOD PRESSURE: 73 MMHG | HEART RATE: 89 BPM

## 2024-10-09 VITALS
DIASTOLIC BLOOD PRESSURE: 78 MMHG | RESPIRATION RATE: 18 BRPM | WEIGHT: 221.6 LBS | HEIGHT: 77 IN | HEART RATE: 84 BPM | SYSTOLIC BLOOD PRESSURE: 132 MMHG | BODY MASS INDEX: 26.17 KG/M2 | OXYGEN SATURATION: 99 % | TEMPERATURE: 97.8 F

## 2024-10-09 DIAGNOSIS — M79.671 FOOT PAIN, RIGHT: ICD-10-CM

## 2024-10-09 DIAGNOSIS — R16.1 SPLENOMEGALY: Primary | ICD-10-CM

## 2024-10-09 DIAGNOSIS — L60.0 ONYCHOCRYPTOSIS: Primary | ICD-10-CM

## 2024-10-09 DIAGNOSIS — M72.2 PLANTAR FASCIITIS: ICD-10-CM

## 2024-10-09 PROCEDURE — G0463 HOSPITAL OUTPT CLINIC VISIT: HCPCS | Performed by: PHYSICIAN ASSISTANT

## 2024-10-09 RX ORDER — CEPHALEXIN 500 MG/1
500 CAPSULE ORAL 3 TIMES DAILY
Qty: 30 CAPSULE | Refills: 0 | Status: SHIPPED | OUTPATIENT
Start: 2024-10-09 | End: 2024-10-19

## 2024-10-09 RX ORDER — METHYLPREDNISOLONE 4 MG
TABLET, DOSE PACK ORAL
Qty: 21 TABLET | Refills: 0 | Status: SHIPPED | OUTPATIENT
Start: 2024-10-09

## 2024-10-15 ENCOUNTER — TELEPHONE (OUTPATIENT)
Dept: UROLOGY | Facility: CLINIC | Age: 39
End: 2024-10-15
Payer: MEDICAID

## 2024-10-18 ENCOUNTER — TELEPHONE (OUTPATIENT)
Dept: ONCOLOGY | Facility: HOSPITAL | Age: 39
End: 2024-10-18
Payer: MEDICAID

## 2024-10-25 ENCOUNTER — TELEPHONE (OUTPATIENT)
Dept: ORTHOPEDIC SURGERY | Facility: CLINIC | Age: 39
End: 2024-10-25
Payer: MEDICAID

## 2024-10-25 NOTE — TELEPHONE ENCOUNTER
ATTEMPTED TO WARM TRANSFER      Caller: ANNELIESE BYNUM    Relationship to patient: SELF    Best call back number: [phone] (CAN LEAVE A DETAILED  MSG IF NEEDED)    Chief complaint: PATIENT HAS A NEW PROBLEM. WAS PULLING STUFF FROM HIS BASEMENT YESTERDAY AND NOW HIS RIGHT SHOULDER IS HURTING. HE HAD A POPPING PROBLEM IN THAT SHOULDER AND SAW HIS PCP ON 12/20/23 AND HAD X-RAYS DONE. IN [facility]. BUT THIS OCCURRED YESTERDAY. PATIENT LEAVES TO GO OUT OF TOWN ON 10/30/24 AND WILL BE GONE FOR 2 WEEKS. PATIENT ASKING IF DR. JACKSON COULD WORK HIM IN BEFORE HE LEAVES. HUB HAS NO AVAILABILITY.    Type of visit:  NEW PROBLEM    Requested date:  BEFORE 10/30/24

## 2024-11-21 ENCOUNTER — TELEPHONE (OUTPATIENT)
Dept: FAMILY MEDICINE CLINIC | Facility: CLINIC | Age: 39
End: 2024-11-21

## 2024-11-21 NOTE — TELEPHONE ENCOUNTER
Caller: Carlos Ghosh    Relationship: Self    Best call back number: [phone]    What medication are you requesting: EAR DROPS     What are your current symptoms: EAR ACHE- PATIENT HAS BEEN COVID POSITIVE FOR A FEW DAYS     How long have you been experiencing symptoms: A FEW DAYS     Have you had these symptoms before:    [x] Yes  [] No    Have you been treated for these symptoms before:   [x] Yes  [] No    If a prescription is needed, what is your preferred pharmacy and phone number: [facility] #[id number] - [place] [address] [facility] [phone] [facility] [phone] FX

## 2024-11-25 ENCOUNTER — OFFICE VISIT (OUTPATIENT)
Dept: FAMILY MEDICINE CLINIC | Facility: CLINIC | Age: 39
End: 2024-11-25
Payer: MEDICAID

## 2024-11-25 VITALS
DIASTOLIC BLOOD PRESSURE: 83 MMHG | TEMPERATURE: 97.2 F | WEIGHT: 224 LBS | OXYGEN SATURATION: 98 % | BODY MASS INDEX: 26.45 KG/M2 | HEIGHT: 77 IN | SYSTOLIC BLOOD PRESSURE: 121 MMHG | HEART RATE: 96 BPM

## 2024-11-25 DIAGNOSIS — Z00.00 ENCOUNTER FOR ANNUAL PHYSICAL EXAM: Primary | ICD-10-CM

## 2024-11-25 PROCEDURE — 99396 PREV VISIT EST AGE 40-64: CPT | Performed by: FAMILY MEDICINE

## 2024-11-25 PROCEDURE — 3044F HG A1C LEVEL LT 7.0%: CPT | Performed by: FAMILY MEDICINE

## 2024-11-25 PROCEDURE — 1126F AMNT PAIN NOTED NONE PRSNT: CPT | Performed by: FAMILY MEDICINE

## 2024-11-25 NOTE — PROGRESS NOTES
Chief Complaint   Patient presents with    Annual Exam        Subjective     Carlos Ghosh  has a past medical history of Anxiety disorder (08/28/2018), Chronic bilateral low back pain without sciatica (02/15/2019), Foot pain, left (07/11/2018), Foot pain, left, Foot pain, right (07/11/2018), Onychia of toe (08/13/2018), Pain of left heel, Paronychia of toe (08/13/2018), and Tinea unguium (07/11/2018).    Annual exam-overall he has been doing well.  Although recently about 10 days ago he was positive for COVID and then about 2 days ago he was to the urgent care again and had a left otitis media.  He is currently being treated for the otitis media.        PHQ-2 Depression Screening  Little interest or pleasure in doing things?     Feeling down, depressed, or hopeless?     PHQ-2 Total Score     PHQ-9 Depression Screening  Little interest or pleasure in doing things?     Feeling down, depressed, or hopeless?     Trouble falling or staying asleep, or sleeping too much?     Feeling tired or having little energy?     Poor appetite or overeating?     Feeling bad about yourself - or that you are a failure or have let yourself or your family down?     Trouble concentrating on things, such as reading the newspaper or watching television?     Moving or speaking so slowly that other people could have noticed? Or the opposite - being so fidgety or restless that you have been moving around a lot more than usual?     Thoughts that you would be better off dead, or of hurting yourself in some way?     PHQ-9 Total Score     If you checked off any problems, how difficult have these problems made it for you to do your work, take care of things at home, or get along with other people?       No Known Allergies    Prior to Admission medications    Medication Sig Start Date End Date Taking? Authorizing Provider   amoxicillin (AMOXIL) 400 MG/5ML suspension Take 6.3 mL by mouth 3 (Three) Times a Day for 10 days. 11/23/24 12/3/24 Yes Lawrence  ABDULAZIZ Ramirez   Diclofenac Sodium (VOLTAREN) 1 % gel gel Apply 4 g topically to the appropriate area as directed 4 (Four) Times a Day As Needed (as needed). 9/14/23  Yes Harish Olivares MD   fluticasone (FLONASE) 50 MCG/ACT nasal spray Administer 2 sprays into the nostril(s) as directed by provider Daily. 11/23/24  Yes Rosina Bravo APRN   predniSONE (DELTASONE) 20 MG tablet Take 1 tablet by mouth 2 (Two) Times a Day for 5 days. 11/23/24 11/28/24 Yes Rosina Bravo APRN        Patient Active Problem List   Diagnosis    Anxiety disorder    Chronic bilateral low back pain without sciatica    Ingrown nail    Foot pain, left    Foot pain, right    Tinea unguium    Dysuria    Bilateral impacted cerumen    Microhematuria    Benign prostatic hyperplasia with lower urinary tract symptoms    Periodontal disease    Acute pain of left shoulder    Pain of maxillary sinus    Encounter for annual physical exam    Acute right-sided thoracic back pain    Post-nasal drainage    Elevated uric acid in blood    Essential tremor    Herpes zoster without complication    Kidney stone        Past Surgical History:   Procedure Laterality Date    COLONOSCOPY  2015    DR DILLON HATFIELD    EAR TUBES      TONSILLECTOMY         Social History     Socioeconomic History    Marital status: Single   Tobacco Use    Smoking status: Never     Passive exposure: Never    Smokeless tobacco: Never   Vaping Use    Vaping status: Never Used   Substance and Sexual Activity    Alcohol use: Never    Drug use: Never    Sexual activity: Defer       Family History   Problem Relation Age of Onset    Depression Mother     Paranoid behavior Father     OCD Father     Bipolar disorder Father     Early death Father 52        FATHER PASSED AWAY FROM COLON CANCER AT AGE 52    Colon cancer Father         FAMILY HISTORY OF COLON CANCER    No Known Problems Sister     No Known Problems Brother     No Known Problems Maternal Aunt     No Known Problems Paternal Aunt      "No Known Problems Maternal Uncle     No Known Problems Paternal Uncle     No Known Problems Maternal Grandfather     Dementia Maternal Grandmother     No Known Problems Paternal Grandfather     No Known Problems Paternal Grandmother     No Known Problems Cousin     No Known Problems Other     ADD / ADHD Neg Hx     Alcohol abuse Neg Hx     Anxiety disorder Neg Hx     Drug abuse Neg Hx     Schizophrenia Neg Hx     Seizures Neg Hx     Self-Injurious Behavior  Neg Hx     Suicide Attempts Neg Hx        Family history, surgical history, past medical history, Allergies and meds reviewed with patient today and updated in [facility] EMR.     ROS:  Review of Systems   Constitutional:  Negative for chills, fatigue and fever.   HENT:  Negative for congestion, postnasal drip and rhinorrhea.    Eyes:  Negative for blurred vision and visual disturbance.   Respiratory:  Negative for cough, chest tightness, shortness of breath and wheezing.    Cardiovascular:  Negative for chest pain and palpitations.   Gastrointestinal:  Negative for constipation and diarrhea.   Allergic/Immunologic: Negative for environmental allergies.   Neurological:  Negative for headache.   Psychiatric/Behavioral:  Negative for depressed mood. The patient is nervous/anxious.        OBJECTIVE:  Vitals:    11/25/24 0903   BP: 121/83   BP Location: Right arm   Patient Position: Sitting   Pulse: 96   Temp: 97.2 °F (36.2 °C)   SpO2: 98%   Weight: 102 kg (224 lb)   Height: 195.6 cm (77\")     No results found.   Body mass index is 26.56 kg/m².  No LMP for male patient.    The 10-year ASCVD risk score (Dayanara HOLGUIN, et al., 2019) is: 1.7%    Values used to calculate the score:      Age: 39 years      Sex: Male      Is Non- : No      Diabetic: Yes      Tobacco smoker: No      Systolic Blood Pressure: 121 mmHg      Is BP treated: No      HDL Cholesterol: 43 mg/dL      Total Cholesterol: 169 mg/dL     Physical Exam  Vitals and nursing note reviewed. "   Constitutional:       General: He is not in acute distress.     Appearance: Normal appearance. He is normal weight.   HENT:      Head: Normocephalic.      Right Ear: Tympanic membrane, ear canal and external ear normal.      Left Ear: Tympanic membrane, ear canal and external ear normal.      Nose: Nose normal.      Mouth/Throat:      Mouth: Mucous membranes are moist.      Pharynx: Oropharynx is clear.   Eyes:      General: No scleral icterus.     Conjunctiva/sclera: Conjunctivae normal.      Pupils: Pupils are equal, round, and reactive to light.   Cardiovascular:      Rate and Rhythm: Normal rate and regular rhythm.      Pulses: Normal pulses.      Heart sounds: Normal heart sounds. No murmur heard.  Pulmonary:      Effort: Pulmonary effort is normal.      Breath sounds: Normal breath sounds. No wheezing, rhonchi or rales.   Musculoskeletal:      Cervical back: Neck supple. No rigidity or tenderness.   Lymphadenopathy:      Cervical: No cervical adenopathy.   Skin:     General: Skin is warm and dry.      Coloration: Skin is not jaundiced.      Findings: No rash.   Neurological:      General: No focal deficit present.      Mental Status: He is alert and oriented to person, place, and time.   Psychiatric:         Mood and Affect: Mood normal.         Thought Content: Thought content normal.         Judgment: Judgment normal.         Procedures    No visits with results within 30 Day(s) from this visit.   Latest known visit with results is:   Office Visit on 09/16/2024   Component Date Value Ref Range Status    Color 09/16/2024 Yellow  Yellow, Straw, Dark Yellow, Amelia Final    Clarity, UA 09/16/2024 Clear  Clear Final    Specific Gravity  09/16/2024 1.020  1.005 - 1.030 Final    pH, Urine 09/16/2024 6.0  5.0 - 8.0 Final    Leukocytes 09/16/2024 Negative  Negative Final    Nitrite, UA 09/16/2024 Negative  Negative Final    Protein, POC 09/16/2024 Trace (A)  Negative mg/dL Final    Glucose, UA 09/16/2024 Negative   Negative mg/dL Final    Ketones, UA 09/16/2024 Negative  Negative Final    Urobilinogen, UA 09/16/2024 0.2 E.U./dL  Normal, 0.2 E.U./dL Final    Bilirubin 09/16/2024 Negative  Negative Final    Blood, UA 09/16/2024 Negative  Negative Final    Lot Number 09/16/2024 [id number]   Final    Expiration Date 09/16/2024 11/30/2025   Final       ASSESSMENT/ PLAN:    Diagnoses and all orders for this visit:    1. Encounter for annual physical exam (Primary)  Assessment & Plan:  Overall he is not really doing too bad.  He is only had his acute episode of an otitis media and COVID which she is recovering from.  Will get an update on just some routine annual labs.    Orders:  -     Comprehensive Metabolic Panel  -     CBC & Differential  -     TSH  -     Lipid Panel               Orders Placed Today:     No orders of the defined types were placed in this encounter.       Management Plan:     An After Visit Summary was printed and given to the patient at discharge.    Follow-up: Return in about 1 year (around 11/25/2025) for Annual physical.    Grant Gonsalez,  11/25/2024 09:31 EST  This note was electronically signed.

## 2024-11-25 NOTE — ASSESSMENT & PLAN NOTE
Overall he is not really doing too bad.  He is only had his acute episode of an otitis media and COVID which she is recovering from.  Will get an update on just some routine annual labs.

## 2024-12-09 ENCOUNTER — TELEPHONE (OUTPATIENT)
Dept: ONCOLOGY | Facility: HOSPITAL | Age: 39
End: 2024-12-09
Payer: MEDICAID

## 2024-12-09 DIAGNOSIS — R16.1 SPLENOMEGALY: Primary | ICD-10-CM

## 2024-12-09 NOTE — TELEPHONE ENCOUNTER
Left patient a message that we need his US to be rescheduled. And reminded patient he is scheduled 12/10/24 for follow up.  
CARE PROVIDERS:  Administration: Anuja Wharton  Administration: Lamont Best  Administration: Thien Gould  Administration: Alfred Murillo  Admitting: Soy Oh  Attending: Soy Oh  Case Management: Santaromana, Anna  Consultant: Naye Aguilar  Consultant: Stacia Mcnamara  Nurse: Radha Michelle  Nurse: Ismael Parham  Nurse: Aubree Best  Nurse: April Hill  Nurse: Fani Bob  Override: Aliyah Nichols  PCA/Nursing Assistant: Autumn Guzman  PCA/Nursing Assistant: Janak Ewing  Physical Therapy: Blane Thomas  Primary Team: Martinez Mascorro  Primary Team: Stalin Pace  Primary Team: Owen Mon  Primary Team: Royce Garrett  Primary Team: Beth Joy  Primary Team: Shobha Rodarte  Primary Team: Lucas Pope  Primary Team: Chris Hall  Primary Team: Rosendo Mccormick  Quality Review: Radha Gilbert  Research: Royce Campos  : Myra Reyes  Team: [facility]NE[facility] CH[facility] Hospitalists, Team  UR// Supp. Assoc.: Juanita Ruano

## 2024-12-18 ENCOUNTER — HOSPITAL ENCOUNTER (OUTPATIENT)
Dept: GENERAL RADIOLOGY | Facility: HOSPITAL | Age: 39
Discharge: HOME | End: 2024-12-18
Admitting: STUDENT IN AN ORGANIZED HEALTH CARE EDUCATION/TRAINING PROGRAM
Payer: MEDICAID

## 2024-12-18 ENCOUNTER — OFFICE VISIT (OUTPATIENT)
Dept: FAMILY MEDICINE CLINIC | Facility: CLINIC | Age: 39
End: 2024-12-18
Payer: MEDICAID

## 2024-12-18 VITALS
DIASTOLIC BLOOD PRESSURE: 76 MMHG | BODY MASS INDEX: 26.45 KG/M2 | OXYGEN SATURATION: 100 % | WEIGHT: 224 LBS | HEIGHT: 77 IN | TEMPERATURE: 98.4 F | SYSTOLIC BLOOD PRESSURE: 126 MMHG | HEART RATE: 86 BPM

## 2024-12-18 DIAGNOSIS — R26.89 IMBALANCE: ICD-10-CM

## 2024-12-18 DIAGNOSIS — R07.89 CHEST DISCOMFORT: ICD-10-CM

## 2024-12-18 DIAGNOSIS — R07.89 CHEST DISCOMFORT: Primary | ICD-10-CM

## 2024-12-18 DIAGNOSIS — H93.19 TINNITUS, UNSPECIFIED LATERALITY: ICD-10-CM

## 2024-12-18 PROCEDURE — 71046 X-RAY EXAM CHEST 2 VIEWS: CPT

## 2024-12-18 PROCEDURE — 1126F AMNT PAIN NOTED NONE PRSNT: CPT | Performed by: STUDENT IN AN ORGANIZED HEALTH CARE EDUCATION/TRAINING PROGRAM

## 2024-12-18 PROCEDURE — 3044F HG A1C LEVEL LT 7.0%: CPT | Performed by: STUDENT IN AN ORGANIZED HEALTH CARE EDUCATION/TRAINING PROGRAM

## 2024-12-18 PROCEDURE — 99214 OFFICE O/P EST MOD 30 MIN: CPT | Performed by: STUDENT IN AN ORGANIZED HEALTH CARE EDUCATION/TRAINING PROGRAM

## 2024-12-18 NOTE — PROGRESS NOTES
"Chief Complaint  Tinnitus (Both ears comes and go's started about 3 days ago ) and URI (Congestion been going on a about a month )    Subjective      Carlos Ghosh is a 39 y.o. male who presents to [facility]     Same day visit:  Patient comes with cc of chest discomfort \"like something is felling my chest\" pt is concern as grand mother is in the hospital with covid and presented pleural effusions. Will obtain XR chest.   Patient also present tinnitus with occasional imbalance. He recently had a viral disease that might explain symptoms education given. Pt qualify for vestibular therapy. Pt declined at this time as he said his symptoms are not as severe but he will call and let us know.     Objective   Vital Signs:   Vitals:    12/18/24 0841   BP: 126/76   Pulse: 86   Temp: 98.4 °F (36.9 °C)   TempSrc: Temporal   SpO2: 100%   Weight: 102 kg (224 lb)   Height: 195.6 cm (77.01\")     Body mass index is 26.56 kg/m².    Wt Readings from Last 3 Encounters:   12/18/24 102 kg (224 lb)   11/25/24 102 kg (224 lb)   11/23/24 101 kg (223 lb 6.4 oz)     BP Readings from Last 3 Encounters:   12/18/24 126/76   11/25/24 121/83   11/23/24 123/84       Health Maintenance   Topic Date Due    Hepatitis B (1 of 3 - 19+ 3-dose series) Never done    COVID-19 Vaccine (1 - 2024-25 season) Never done    INFLUENZA VACCINE  03/31/2025 (Originally 7/1/2024)    Pneumococcal Vaccine 0-64 (1 of 2 - PCV) 12/18/2025 (Originally 5/12/1990)    TDAP/TD VACCINES (1 - Tdap) 12/18/2025 (Originally 5/12/2003)    BMI FOLLOWUP  03/19/2025    ANNUAL PHYSICAL  11/25/2025    COLORECTAL CANCER SCREENING  12/23/2025    HEPATITIS C SCREENING  Completed    HEMOGLOBIN A1C  Discontinued    DIABETIC EYE EXAM  Discontinued    URINE MICROALBUMIN  Discontinued       Physical Exam  Vitals reviewed.   HENT:      Head: Normocephalic.      Mouth/Throat:      Mouth: Mucous membranes are moist.   Eyes:      Pupils: Pupils are equal, round, and " reactive to light.   Cardiovascular:      Rate and Rhythm: Normal rate.   Abdominal:      General: Abdomen is flat.   Musculoskeletal:         General: Normal range of motion.      Cervical back: Normal range of motion.   Skin:     General: Skin is warm.      Capillary Refill: Capillary refill takes less than 2 seconds.   Neurological:      Mental Status: He is alert.            Assessment & Plan  Chest discomfort    Orders:    XR Chest 2 View; Future    Tinnitus, unspecified laterality  Vestibular therapy          Imbalance                    I spent 20 minutes caring for Calros on this date of service. This time includes time spent by me in the following activities:preparing for the visit, reviewing tests, obtaining and/or reviewing a separately obtained history, performing a medically appropriate examination and/or evaluation, counseling and educating the patient/family/caregiver, ordering medications, tests, or procedures, referring and communicating with other health care professionals, documenting information in the medical record, independently interpreting results and communicating that information with the patient/family/caregiver, care coordination.    FOLLOW UP  No follow-ups on file.  Patient was given instructions and counseling regarding his condition or for health maintenance advice. Please see specific information pulled into the AVS if appropriate.       Johnny Marie MD  12/18/24  09:14 EST    CURRENT & DISCONTINUED MEDICATIONS  Current Outpatient Medications   Medication Instructions    Diclofenac Sodium (VOLTAREN) 4 g, Topical, 4 Times Daily PRN    fluticasone (FLONASE) 50 MCG/ACT nasal spray 2 sprays, Nasal, Daily       There are no discontinued medications.

## 2024-12-23 ENCOUNTER — HOSPITAL ENCOUNTER (OUTPATIENT)
Dept: ULTRASOUND IMAGING | Facility: HOSPITAL | Age: 39
Discharge: HOME | End: 2024-12-23
Payer: MEDICAID

## 2024-12-23 ENCOUNTER — LAB (OUTPATIENT)
Dept: LAB | Facility: HOSPITAL | Age: 39
End: 2024-12-23
Payer: MEDICAID

## 2024-12-23 DIAGNOSIS — R16.1 SPLENOMEGALY: ICD-10-CM

## 2024-12-23 LAB
ALBUMIN SERPL-MCNC: 4.5 G/DL (ref 3.5–5.2)
ALBUMIN/GLOB SERPL: 2 G/DL
ALP SERPL-CCNC: 67 U/L (ref 39–117)
ALT SERPL W P-5'-P-CCNC: 19 U/L (ref 1–41)
ANION GAP SERPL CALCULATED.3IONS-SCNC: 15.8 MMOL/L (ref 5–15)
AST SERPL-CCNC: 19 U/L (ref 1–40)
BASOPHILS # BLD AUTO: 0.07 10*3/MM3 (ref 0–0.2)
BASOPHILS NFR BLD AUTO: 1.4 % (ref 0–1.5)
BILIRUB SERPL-MCNC: 0.6 MG/DL (ref 0–1.2)
BUN SERPL-MCNC: 9 MG/DL (ref 6–20)
BUN/CREAT SERPL: 8.7 (ref 7–25)
CALCIUM SPEC-SCNC: 9.1 MG/DL (ref 8.6–10.5)
CHLORIDE SERPL-SCNC: 105 MMOL/L (ref 98–107)
CHOLEST SERPL-MCNC: 182 MG/DL (ref 0–200)
CO2 SERPL-SCNC: 22.2 MMOL/L (ref 22–29)
CREAT SERPL-MCNC: 1.04 MG/DL (ref 0.76–1.27)
DEPRECATED RDW RBC AUTO: 39.8 FL (ref 37–54)
EGFRCR SERPLBLD CKD-EPI 2021: 93.1 ML/MIN/1.73
EOSINOPHIL # BLD AUTO: 0.18 10*3/MM3 (ref 0–0.4)
EOSINOPHIL NFR BLD AUTO: 3.7 % (ref 0.3–6.2)
ERYTHROCYTE [DISTWIDTH] IN BLOOD BY AUTOMATED COUNT: 13 % (ref 12.3–15.4)
ERYTHROCYTE [SEDIMENTATION RATE] IN BLOOD: <1 MM/HR (ref 0–15)
GLOBULIN UR ELPH-MCNC: 2.3 GM/DL
GLUCOSE SERPL-MCNC: 108 MG/DL (ref 65–99)
HCT VFR BLD AUTO: 44.6 % (ref 37.5–51)
HDLC SERPL-MCNC: 38 MG/DL (ref 40–60)
HGB BLD-MCNC: 15.1 G/DL (ref 13–17.7)
IMM GRANULOCYTES # BLD AUTO: 0.01 10*3/MM3 (ref 0–0.05)
IMM GRANULOCYTES NFR BLD AUTO: 0.2 % (ref 0–0.5)
LDLC SERPL CALC-MCNC: 135 MG/DL (ref 0–100)
LDLC/HDLC SERPL: 3.56 {RATIO}
LYMPHOCYTES # BLD AUTO: 1.87 10*3/MM3 (ref 0.7–3.1)
LYMPHOCYTES NFR BLD AUTO: 38 % (ref 19.6–45.3)
MCH RBC QN AUTO: 28.7 PG (ref 26.6–33)
MCHC RBC AUTO-ENTMCNC: 33.9 G/DL (ref 31.5–35.7)
MCV RBC AUTO: 84.8 FL (ref 79–97)
MONOCYTES # BLD AUTO: 0.38 10*3/MM3 (ref 0.1–0.9)
MONOCYTES NFR BLD AUTO: 7.7 % (ref 5–12)
NEUTROPHILS NFR BLD AUTO: 2.41 10*3/MM3 (ref 1.7–7)
NEUTROPHILS NFR BLD AUTO: 49 % (ref 42.7–76)
NRBC BLD AUTO-RTO: 0 /100 WBC (ref 0–0.2)
PLATELET # BLD AUTO: 243 10*3/MM3 (ref 140–450)
PMV BLD AUTO: 9.7 FL (ref 6–12)
POTASSIUM SERPL-SCNC: 4.3 MMOL/L (ref 3.5–5.2)
PROT SERPL-MCNC: 6.8 G/DL (ref 6–8.5)
RBC # BLD AUTO: 5.26 10*6/MM3 (ref 4.14–5.8)
SODIUM SERPL-SCNC: 143 MMOL/L (ref 136–145)
TRIGL SERPL-MCNC: 44 MG/DL (ref 0–150)
TSH SERPL DL<=0.05 MIU/L-ACNC: 1.6 UIU/ML (ref 0.27–4.2)
URATE SERPL-MCNC: 7.8 MG/DL (ref 3.4–7)
VLDLC SERPL-MCNC: 9 MG/DL (ref 5–40)
WBC NRBC COR # BLD AUTO: 4.92 10*3/MM3 (ref 3.4–10.8)

## 2024-12-23 PROCEDURE — 84550 ASSAY OF BLOOD/URIC ACID: CPT | Performed by: PHYSICIAN ASSISTANT

## 2024-12-23 PROCEDURE — 80061 LIPID PANEL: CPT | Performed by: FAMILY MEDICINE

## 2024-12-23 PROCEDURE — 76705 ECHO EXAM OF ABDOMEN: CPT

## 2024-12-23 PROCEDURE — 84443 ASSAY THYROID STIM HORMONE: CPT | Performed by: FAMILY MEDICINE

## 2024-12-23 PROCEDURE — 80053 COMPREHEN METABOLIC PANEL: CPT | Performed by: PHYSICIAN ASSISTANT

## 2024-12-26 ENCOUNTER — TELEPHONE (OUTPATIENT)
Dept: GASTROENTEROLOGY | Facility: CLINIC | Age: 39
End: 2024-12-26
Payer: MEDICAID

## 2024-12-26 NOTE — TELEPHONE ENCOUNTER
Patient wanted to be seen sooner for a new symptom. He did not want to go into detail why. He also wants his colonoscopy scheduled sooner that Dec 2025 (which is when its due). I did not see a sooner availability for an appointment. Please advise.

## 2025-01-07 ENCOUNTER — TELEPHONE (OUTPATIENT)
Dept: ONCOLOGY | Facility: HOSPITAL | Age: 40
End: 2025-01-07

## 2025-01-07 NOTE — PROGRESS NOTES
Chief Complaint/Care Team   No chief complaint on file.    Grant Gonsalez*  Grant Gnosalez, DO      TELEHEALTH DISCLAIMER  You have chosen to receive care through a video telehealth visit. Do you consent to use a video telehealth visit for your medical care today?  YES  This visit has been scheduled or rescheduled as a video telehealth visit to comply with patient safety & health concerns in accordance with CDC recommendations.    Total time of discussion was 18 minutes.   I was located at [facility] Hem/onc clinic and pt was located at home.      History of Present Illness     Diagnosis: Splenomegaly    Current Treatment: Active Surveillance  Previous Treatment: None    Carlos Ghosh is a 39 y.o. male who presents to [facility] HEMATOLOGY & ONCOLOGY for evaluation of splenomegaly.    further evaluation and treatment of his splenomegaly.    CBC reveals a white blood cell count of 4.4, hemoglobin of 14.9, hematocrit of 43.4, MCV of 86.3, platelet count of 238, normal differential.    Normal uric acid level, PTH is normal, CMP reveals a mildly elevated glucose of 121, normal kidney function and liver function testing, normal calcium and protein level.    Urine from August 2024 is normal  Diagnostic PSA is normal at 0.7 past medical history for benign prostatic hyperplasia with lower urinary tract symptoms followed by Dr. Acosta urologist.    Essential tremor diagnosed and treated by neurologist Dr. Little    CT scan from August 1, 2024 renal stone protocol reveals a 3 mm calcification in the left distal ureter but of note there was mild splenomegaly measuring 16.4 cm    CT scan from August 29, 2024 reveals no urethral stones, liver was normal spleen was noted to be normal in size.  Pancreas and adrenal glands were also normal.    Patient denies any fatigue, night sweats, weight loss.        I have reviewed medical history above.  History of Present Illness  The patient presents via  virtual visit to discuss splenomegaly.    He reports undergoing a CT scan for kidney stone, which showed an enlarged spleen. He underwent an abdominal ultrasound ordered by GILES Coffman which showed his spleen measured 15 x 5 x 14.6, indicating mild enlargement. There were no signs of liver enlargement or masses, and his kidneys appeared normal with no mention of kidney stones. A test for mononucleosis was negative, and a recent COVID-19 test in September 2024 was also negative. A basic CBC collected around [place] time showed normal white count, hemoglobin, and platelets. His white cells were broken down into different subgroups, all of which were normal. His platelet count has been normal for the last 2 years. Electrolytes, kidney function, and liver function were all normal. He is not experiencing any significant left-sided abdominal pain, attributing minor discomfort to gas. He recently had a viral infection and is uncertain if it contributed to his condition. He first became aware of his splenomegaly following a CT scan for kidney stones in August 2024. He reports no palpable masses in his neck, groin, or armpits, and his weight has remained stable. He is 6 feet 5 inches tall and questions whether his height could be a factor in his organ size.    He has a history of kidney stones, having passed one in August 2024 or September 2024. He is considering dietary and exercise modifications to manage his uric acid levels. He maintains a vegetarian diet, abstaining from beef and alcohol, and consumes black beans and green beans.    He has observed elevated uric acid (7.8), glucose (108), AG ratio (2.0), and anion gap (15.8) levels on his blood work and seeks clarification on these findings. He has no history of gout.    SOCIAL HISTORY  He does not drink alcohol.       Review of Systems     Oncology/Hematology History    No history exists.       Objective     There were no vitals filed for this visit.        No  physical exam was performed as this was a telemedicine appointment.    PHQ-9 Total Score:         Physical Exam    Physical Exam        Past Medical History     Past Medical History:   Diagnosis Date    Anxiety disorder 08/28/2018    Chronic bilateral low back pain without sciatica 02/15/2019    Foot pain, left 07/11/2018    Foot pain, left     Foot pain, right 07/11/2018    Onychia of toe 08/13/2018    Pain of left heel     Paronychia of toe 08/13/2018    Tinea unguium 07/11/2018     Current Outpatient Medications on File Prior to Visit   Medication Sig Dispense Refill    Diclofenac Sodium (VOLTAREN) 1 % gel gel Apply 4 g topically to the appropriate area as directed 4 (Four) Times a Day As Needed (as needed). 100 g 1    fluticasone (FLONASE) 50 MCG/ACT nasal spray Administer 2 sprays into the nostril(s) as directed by provider Daily. 16 g 0     No current facility-administered medications on file prior to visit.      No Known Allergies  Past Surgical History:   Procedure Laterality Date    COLONOSCOPY  2015    DR DILLON HATFIELD    EAR TUBES      TONSILLECTOMY       Social History     Socioeconomic History    Marital status: Single   Tobacco Use    Smoking status: Never     Passive exposure: Never    Smokeless tobacco: Never   Vaping Use    Vaping status: Never Used   Substance and Sexual Activity    Alcohol use: Never    Drug use: Never    Sexual activity: Defer     Family History   Problem Relation Age of Onset    Depression Mother     Paranoid behavior Father     OCD Father     Bipolar disorder Father     Early death Father 52        FATHER PASSED AWAY FROM COLON CANCER AT AGE 52    Colon cancer Father         FAMILY HISTORY OF COLON CANCER    No Known Problems Sister     No Known Problems Brother     No Known Problems Maternal Aunt     No Known Problems Paternal Aunt     No Known Problems Maternal Uncle     No Known Problems Paternal Uncle     No Known Problems Maternal Grandfather     Dementia Maternal  Grandmother     No Known Problems Paternal Grandfather     No Known Problems Paternal Grandmother     No Known Problems Cousin     No Known Problems Other     ADD / ADHD Neg Hx     Alcohol abuse Neg Hx     Anxiety disorder Neg Hx     Drug abuse Neg Hx     Schizophrenia Neg Hx     Seizures Neg Hx     Self-Injurious Behavior  Neg Hx     Suicide Attempts Neg Hx        Results     Result Review   The following data was reviewed by: Duglas Nieves MD on 12/10/2024:  Lab Results   Component Value Date    HGB 15.1 12/23/2024    HCT 44.6 12/23/2024    MCV 84.8 12/23/2024     12/23/2024    WBC 4.92 12/23/2024    NEUTROABS 2.41 12/23/2024    LYMPHSABS 1.87 12/23/2024    MONOSABS 0.38 12/23/2024    EOSABS 0.18 12/23/2024    BASOSABS 0.07 12/23/2024     Lab Results   Component Value Date    GLUCOSE 108 (H) 12/23/2024    BUN 9 12/23/2024    CREATININE 1.04 12/23/2024     12/23/2024    K 4.3 12/23/2024     12/23/2024    CO2 22.2 12/23/2024    CALCIUM 9.1 12/23/2024    PROTEINTOT 6.8 12/23/2024    ALBUMIN 4.5 12/23/2024    BILITOT 0.6 12/23/2024    ALKPHOS 67 12/23/2024    AST 19 12/23/2024    ALT 19 12/23/2024     Lab Results   Component Value Date    MG 2.1 06/19/2023    FREET4 1.26 06/19/2023    TSH 1.600 12/23/2024       No radiology results for the last day  US Abdomen Limited    Result Date: 12/24/2024  Impression: 1. Splenomegaly. Electronically Signed: Ousmane Williamson MD  12/24/2024 9:28 AM EST  Workstation ID: [id number]    XR Chest 2 View    Result Date: 12/19/2024  Impression: No acute infiltrate is appreciated. No cardiac enlargement.   Portions of this note were completed with a voice recognition program.  Electronically Signed By-Carlos Lopez MD On:12/19/2024 2:56 AM       Assessment & Plan     There are no diagnoses linked to this encounter.     Carlos Ghosh is a 39 y.o. male who presents to [facility] HEMATOLOGY & ONCOLOGY for splenomegaly.    Had labs on 12/23/24, US of abd  from 12/23/24  Plan for follow up in 6 weeks with labs CBC, CMP, Peripheral blood smear, flow cytometry, EBV, CMV, HIV, pt to get labs 1-2 week prior to his next follow up with me.  Assessment & Plan  1. Splenomegaly.  Discussed pts most recent laboratory results CBC, CMP were within normal limits, including white count, hemoglobin, platelets, electrolytes, kidney function, and liver function. The abdominal  ultrasound showed an enlarged spleen measuring 15 x 5 x 14.6 cm, with no other abnormalities noted. The patient has no B symptoms such as fever, night sweats, or weight loss. Additional blood tests will be ordered to check for viral infections (CMV, EBV, hepatitis, HIV) and a blood smear and flow cytometry to rule out underlying conditions like leukemia. He is advised to report any new symptoms such as fever, masses, or night sweats. If all tests return negative, monitoring will continue with follow-up lab tests every 6 months.    2. Elevated uric acid.  The patient's uric acid level is 7.8, slightly above the normal range. He has no history of gout. Given his history of kidney stones, a referral to a nephrologist is recommended to determine the type of kidney stones and appropriate management. Pt will discuss this with his PCP. Dietary modifications to reduce uric acid levels, such as decreasing red meat and alcohol intake, are typically recommended for patients with a history of gout. However, he is already a vegetarian and does not consume alcohol. He should discuss further dietary recommendations with his primary care physician.    3. Kidney stones.  The patient has a history of kidney stones, with the most recent episode occurring around August or September 2024. A referral to a nephrologist is recommended to determine the type of kidney stones and appropriate management, including potential dietary modifications.    Follow-up  The patient will follow up in 6 weeks to review the results of the ordered  labs.      Please note that portions of this note were completed with a voice recognition program.    Electronically signed by Duglas Nieves MD, 01/08/25, 12:56 PM EST.      Follow Up     I spent 30 minutes caring for Carlos on this date of service. This time includes time spent by me in the following activities:preparing for the visit, reviewing tests, obtaining and/or reviewing a separately obtained history, performing a medically appropriate examination and/or evaluation , counseling and educating the patient/family/caregiver, ordering medications, tests, or procedures, referring and communicating with other health care professionals , documenting information in the medical record, independently interpreting results and communicating that information with the patient/family/caregiver, and care coordination    Any chemotherapy or immunotherapy or other systemic therapy treatment plan involves a high risk of complications and/or mortality of patient management.    The patient was seen and examined. Work by the provider also included review and/or ordering of lab tests, review and/or ordering of radiology tests, review and/or ordering of medicine tests, discussion with other physicians or providers, independent review of data, obtaining old records, review/summation of old records, and/or other review.    I have reviewed the family history, social history, and past medical history for this patient. Previous information and data has been reviewed and updated as needed. I have reviewed and verified the chief complaint, history, and other documentation. The patient was interviewed and examined in the clinic and the chart reviewed. The previous observations, recommendations, and conclusions were reviewed including those of other providers.     The plan was discussed with the patient and/or family. The patient was given time to ask questions and these questions were answered. At the conclusion of their visit they had no  additional questions or concerns and all questions were answered to their satisfaction.    Patient was given instructions and counseling regarding his condition or for health maintenance advice. Please see specific information pulled into the AVS if appropriate.       Patient or patient representative verbalized consent for the use of Ambient Listening during the visit with  Duglas Nieves MD for chart documentation. 1/8/2025  12:56 EST

## 2025-01-07 NOTE — TELEPHONE ENCOUNTER
Caller: Carlos Ghosh    Relationship: Self    Best call back number: [phone]    What is the best time to reach you: ANYTIME TODAY     PLEASE LEAVE A DETAILED VOICEMAIL MESSAGE IF UNABLE TO REACH     Who are you requesting to speak with (clinical staff, provider,  specific staff member): SCHEDULING      What was the call regarding:     NEEDING TO SEE IF CAN DO TOMORROWS APPT 01/08 FOLLOW UP  AS A TELEHEALTH OR VIDEO VISIT     Is it okay if the provider responds through [facility]: NO      ADDITIONAL NOTE: SPOKE WITH  YAO  SHE ASK TO SEND HIGH PRIORITY MESSAGE

## 2025-01-08 ENCOUNTER — TELEPHONE (OUTPATIENT)
Dept: FAMILY MEDICINE CLINIC | Facility: CLINIC | Age: 40
End: 2025-01-08
Payer: MEDICAID

## 2025-01-08 ENCOUNTER — TELEMEDICINE (OUTPATIENT)
Dept: ONCOLOGY | Facility: HOSPITAL | Age: 40
End: 2025-01-08
Payer: MEDICAID

## 2025-01-08 DIAGNOSIS — R16.1 SPLENOMEGALY: Primary | ICD-10-CM

## 2025-01-08 PROCEDURE — 1126F AMNT PAIN NOTED NONE PRSNT: CPT | Performed by: INTERNAL MEDICINE

## 2025-01-08 PROCEDURE — 99213 OFFICE O/P EST LOW 20 MIN: CPT | Performed by: INTERNAL MEDICINE

## 2025-01-08 NOTE — TELEPHONE ENCOUNTER
Patient called and stated he seen oncology today and they suggested that he needs to see a nephrologist regarding his hx of kidney stones.  They said to contact his PCP to place the referral.  Would like a call back and doesn't care who he ses's

## 2025-01-10 DIAGNOSIS — N20.0 KIDNEY STONES: Primary | ICD-10-CM

## 2025-01-24 ENCOUNTER — TELEPHONE (OUTPATIENT)
Dept: GASTROENTEROLOGY | Facility: CLINIC | Age: 40
End: 2025-01-24
Payer: MEDICAID

## 2025-01-24 NOTE — TELEPHONE ENCOUNTER
Patient has an appt on 01/28/25. He is asking if this can be switched to a telehealth appt because he has to take someone to [place] this day and he is afraid he won't be back in town on time. He doesn't want to lose this appt. Please advise.

## 2025-01-27 ENCOUNTER — OFFICE VISIT (OUTPATIENT)
Dept: GASTROENTEROLOGY | Facility: CLINIC | Age: 40
End: 2025-01-27
Payer: MEDICAID

## 2025-01-27 VITALS
DIASTOLIC BLOOD PRESSURE: 71 MMHG | WEIGHT: 230 LBS | HEIGHT: 77 IN | SYSTOLIC BLOOD PRESSURE: 113 MMHG | BODY MASS INDEX: 27.16 KG/M2 | HEART RATE: 88 BPM

## 2025-01-27 DIAGNOSIS — Z80.0 FH: COLON CANCER: ICD-10-CM

## 2025-01-27 DIAGNOSIS — R10.30 LOWER ABDOMINAL PAIN: ICD-10-CM

## 2025-01-27 DIAGNOSIS — R19.5 DARK STOOLS: Primary | ICD-10-CM

## 2025-01-27 DIAGNOSIS — R19.4 CHANGE IN BOWEL HABITS: ICD-10-CM

## 2025-01-27 PROCEDURE — 1159F MED LIST DOCD IN RCRD: CPT | Performed by: NURSE PRACTITIONER

## 2025-01-27 PROCEDURE — 1160F RVW MEDS BY RX/DR IN RCRD: CPT | Performed by: NURSE PRACTITIONER

## 2025-01-27 PROCEDURE — 99214 OFFICE O/P EST MOD 30 MIN: CPT | Performed by: NURSE PRACTITIONER

## 2025-01-27 RX ORDER — POLYETHYLENE GLYCOL 3350, SODIUM CHLORIDE, SODIUM BICARBONATE, POTASSIUM CHLORIDE 420; 11.2; 5.72; 1.48 G/4L; G/4L; G/4L; G/4L
4000 POWDER, FOR SOLUTION ORAL ONCE
Qty: 4000 ML | Refills: 0 | Status: SHIPPED | OUTPATIENT
Start: 2025-01-27 | End: 2025-01-27

## 2025-01-27 NOTE — PATIENT INSTRUCTIONS
Start nightly SENEKOT (vegetable laxative with or without stool softener)  Increase fiber and water     Give me an update in 1 week (MYCHART or call office)

## 2025-01-27 NOTE — PROGRESS NOTES
Chief Complaint   Constipation    History of Present Illness       Carlos Ghosh is a 39 y.o. male who presents to [facility] GASTROENTEROLOGY for follow-up for change in bowel habits.     He underwent colonoscopy with Dr. Montanez on 12/22/2020.  Colonoscopy was normal.  Nonbleeding internal hemorrhoids noted.  Recall colonoscopy in 5 years given family history of colon cancer.     He has history of constipation with hemorrhoids---He might have BM every 3 days or so. He has used laxatives on occasion in the past to get cleaned. He denies any reflux or dysphagia. Good appetite. Denies any rectal bleeding right now. Will every once in a while have some bright red bleeding on toilet paper when wiping. He assumes this is from hemorrhoids.      GI family history---Father with colon cancer.          He admits for the past several months he has been having issues with worsening constipation and abd cramping. He denies any rectal bleeding.  Has had several dark stools.  He has been trying to eat more fiber and drink more water.  He tried Metamucil in the past and it did not help.  With these changes it is just really worried him with his dad's history of colon cancer that something could be wrong.  Results       Result Review :       CMP          3/14/2024    10:46 8/1/2024    18:55 12/23/2024    09:29   CMP   Glucose 105  121  108    BUN 8  15  9    Creatinine 1.03  1.05  1.04    EGFR 94.8  92.0  93.1    Sodium 141  142  143    Potassium 4.1  3.5  4.3    Chloride 103  103  105    Calcium 9.1  9.2  9.1    Total Protein 7.0  7.1  6.8    Albumin 4.7  4.6  4.5    Globulin 2.3  2.5  2.3    Total Bilirubin 0.9  0.8  0.6    Alkaline Phosphatase 77  64  67    AST (SGOT) 14  16  19    ALT (SGPT) 17  13  19    Albumin/Globulin Ratio 2.0  1.8  2.0    BUN/Creatinine Ratio 7.8  14.3  8.7    Anion Gap 12.0  12.3  15.8      CBC          8/1/2024    18:55 8/19/2024    09:35 12/23/2024    09:29   CBC   WBC 5.64  4.48   "4.92    RBC 5.28  5.03  5.26    Hemoglobin 15.5  14.9  15.1    Hematocrit 43.9  43.4  44.6    MCV 83.1  86.3  84.8    MCH 29.4  29.6  28.7    MCHC 35.3  34.3  33.9    RDW 13.2  12.8  13.0    Platelets 245  238  243      CBC w/diff          8/1/2024    18:55 8/19/2024    09:35 12/23/2024    09:29   CBC w/Diff   WBC 5.64  4.48  4.92    RBC 5.28  5.03  5.26    Hemoglobin 15.5  14.9  15.1    Hematocrit 43.9  43.4  44.6    MCV 83.1  86.3  84.8    MCH 29.4  29.6  28.7    MCHC 35.3  34.3  33.9    RDW 13.2  12.8  13.0    Platelets 245  238  243    Neutrophil Rel % 52.1  49.8  49.0    Immature Granulocyte Rel % 0.4  0.2  0.2    Lymphocyte Rel % 37.9  37.3  38.0    Monocyte Rel % 6.9  7.6  7.7    Eosinophil Rel % 1.8  4.0  3.7    Basophil Rel % 0.9  1.1  1.4      Lipid Panel          12/23/2024    09:29   Lipid Panel   Total Cholesterol 182    Triglycerides 44    HDL Cholesterol 38    VLDL Cholesterol 9    LDL Cholesterol  135    LDL/HDL Ratio 3.56      TSH          12/23/2024    09:29   TSH   TSH 1.600        Lipase   Lipase   Date Value Ref Range Status   08/01/2024 29 13 - 60 U/L Final     Amylase No results found for: \"AMYLASE\"  Iron Profile No results found for: \"IRON\", \"TIBC\", \"LABIRON\", \"TRANSFERRIN\"  Ferritin No results found for: \"FERRITIN\"  ESR (Sed Rate)   Sed Rate   Date Value Ref Range Status   12/23/2024 <1 0 - 15 mm/hr Final     CRP (C-Reactive) No results found for: \"CRP\"  Liver Workup No results found for: \"AFPTM\", \"DSDNA\", \"EXPANDEDENA\", \"SMOOTHMUSCAB\", \"CERULOPLSM\", \"FERRITIN\", \"LABIMMURE\", \"TOTIGGREF\", \"IGA\", \"IGM\", \"IRON\", \"TIBC\", \"LABIRON\", \"TRANSFERRIN\", \"MITOAB\", \"PROTIME\", \"INR\", \"AFP\"            Past Medical History       Past Medical History:   Diagnosis Date    Anxiety disorder 08/28/2018    Chronic bilateral low back pain without sciatica 02/15/2019    Foot pain, left 07/11/2018    Foot pain, left     Foot pain, right 07/11/2018    Onychia of toe 08/13/2018    Pain of left heel     Paronychia of " toe 08/13/2018    Tinea unguium 07/11/2018       Past Surgical History:   Procedure Laterality Date    COLONOSCOPY  2015    DR DILLON HATFILED    EAR TUBES      TONSILLECTOMY           Current Outpatient Medications:     Diclofenac Sodium (VOLTAREN) 1 % gel gel, Apply 4 g topically to the appropriate area as directed 4 (Four) Times a Day As Needed (as needed)., Disp: 100 g, Rfl: 1    fluticasone (FLONASE) 50 MCG/ACT nasal spray, Administer 2 sprays into the nostril(s) as directed by provider Daily., Disp: 16 g, Rfl: 0    polyethylene glycol-electrolytes (Nulytely with Flavor Packs) 420 g solution, Take 4,000 mL by mouth 1 (One) Time for 1 dose., Disp: 4000 mL, Rfl: 0     No Known Allergies    Family History   Problem Relation Age of Onset    Depression Mother     Paranoid behavior Father     OCD Father     Bipolar disorder Father     Early death Father 52        FATHER PASSED AWAY FROM COLON CANCER AT AGE 52    Colon cancer Father         FAMILY HISTORY OF COLON CANCER    No Known Problems Sister     No Known Problems Brother     No Known Problems Maternal Aunt     No Known Problems Paternal Aunt     No Known Problems Maternal Uncle     No Known Problems Paternal Uncle     No Known Problems Maternal Grandfather     Dementia Maternal Grandmother     No Known Problems Paternal Grandfather     No Known Problems Paternal Grandmother     No Known Problems Cousin     No Known Problems Other     ADD / ADHD Neg Hx     Alcohol abuse Neg Hx     Anxiety disorder Neg Hx     Drug abuse Neg Hx     Schizophrenia Neg Hx     Seizures Neg Hx     Self-Injurious Behavior  Neg Hx     Suicide Attempts Neg Hx         Social History     Social History Narrative    Not on file       Objective       Review of Systems   Constitutional:  Negative for fatigue, fever, unexpected weight gain and unexpected weight loss.   HENT:  Negative for trouble swallowing.    Respiratory:  Negative for cough, choking, chest tightness, shortness of breath,  "wheezing and stridor.    Cardiovascular:  Negative for chest pain, palpitations and leg swelling.   Gastrointestinal:  Positive for abdominal pain and constipation. Negative for abdominal distention, anal bleeding, blood in stool, diarrhea, nausea, rectal pain, vomiting, GERD and indigestion.        Vital Signs:   /71 (BP Location: Right arm, Patient Position: Sitting, Cuff Size: Adult)   Pulse 88   Ht 195.6 cm (77\")   Wt 104 kg (230 lb)   BMI 27.27 kg/m²       Physical Exam  Constitutional:       General: He is not in acute distress.     Appearance: He is well-developed. He is not ill-appearing.   HENT:      Head: Normocephalic.   Eyes:      Pupils: Pupils are equal, round, and reactive to light.   Cardiovascular:      Rate and Rhythm: Normal rate and regular rhythm.      Heart sounds: Normal heart sounds.   Pulmonary:      Effort: Pulmonary effort is normal.      Breath sounds: Normal breath sounds.   Abdominal:      General: Bowel sounds are normal. There is distension.      Palpations: Abdomen is soft. There is no mass.      Tenderness: There is abdominal tenderness. There is no guarding or rebound.      Hernia: No hernia is present.   Musculoskeletal:         General: Normal range of motion.   Skin:     General: Skin is warm and dry.   Neurological:      Mental Status: He is alert and oriented to person, place, and time.   Psychiatric:         Speech: Speech normal.         Behavior: Behavior normal.         Judgment: Judgment normal.           Assessment & Plan          Assessment and Plan    Diagnoses and all orders for this visit:    1. Dark stools (Primary)  -     Case Request; Standing  -     Follow Anesthesia Guidelines / Protocol; Future  -     Case Request    2. Lower abdominal pain  -     Case Request; Standing  -     Follow Anesthesia Guidelines / Protocol; Future  -     Case Request    3. Change in bowel habits  -     polyethylene glycol-electrolytes (Nulytely with Flavor Packs) 420 g " solution; Take 4,000 mL by mouth 1 (One) Time for 1 dose.  Dispense: 4000 mL; Refill: 0  -     Case Request; Standing  -     Follow Anesthesia Guidelines / Protocol; Future  -     Case Request    4. FH: colon cancer  -     Case Request; Standing  -     Follow Anesthesia Guidelines / Protocol; Future  -     Case Request    Other orders  -     Follow Anesthesia Guidelines / Protocol; Standing  -     Verify NPO; Standing  -     Verify Bowel Prep Was Successful; Standing  -     Give Tap Water Enema If Bowel Prep Insufficient; Standing      Given his history and current symptoms recommend colonoscopy with Dr. Montanez for further evaluation.  Patient is agreeable to the scope.  Still like his constipation is getting worse.  I do worry about those dark stools.  Will start Senokot at night for constipation.  He is to continue to increase his fiber and water as tolerated.  Patient to give me an update next week.  Patient to follow-up with me after his scope.  No clearances, no blood thinners.  Nulytely.  Patient is agreeable to the plan.    Surgical Risk and Benefits discussed: Possible risks/complications, benefits, and alternatives to surgical or invasive procedure have been explained to patient and/or legal guardian; risks include bleeding, infection, and perforation. Patient has been evaluated and can tolerate anesthesia and/or sedation. Risks, benefits, and alternatives to anesthesia and sedation have been explained to patient and/or legal guardian.          Follow Up       Follow Up   Return for F/U AFTER PROCEDURE.  Patient was given instructions and counseling regarding his condition or for health maintenance advice. Please see specific information pulled into the AVS if appropriate.

## 2025-02-10 ENCOUNTER — TELEPHONE (OUTPATIENT)
Dept: FAMILY MEDICINE CLINIC | Facility: CLINIC | Age: 40
End: 2025-02-10
Payer: MEDICAID

## 2025-02-10 NOTE — TELEPHONE ENCOUNTER
Relay     The offices here in [place] are not taking any new medicaid patients.  The offices that I found that take medicaid are 2 in [place]- [facility]  [phone] and [facility]- [phone].  There is one in [facility]  [phone] and one in [facility]  [phone].  You can call and make an appointment at any of these clinics.    Thanks,      Kailey Rmoo Referrals

## 2025-02-10 NOTE — TELEPHONE ENCOUNTER
Caller: Carlos Ghosh    Relationship: Self    Best call back number: [phone]     What is the medical concern/diagnosis: MOLES ON SKIN IN GROIN AREA    What specialty or service is being requested: DERMATOLOGY     What is the provider, practice or medical service name: [facility]      Any additional details: PATIENT STATES HE WILL COME IN FOR AN APPOINTMENT IF NEEDED TO GET THE REFERRAL

## 2025-02-17 ENCOUNTER — LAB (OUTPATIENT)
Dept: LAB | Facility: HOSPITAL | Age: 40
End: 2025-02-17
Payer: MEDICAID

## 2025-02-17 ENCOUNTER — OFFICE VISIT (OUTPATIENT)
Dept: FAMILY MEDICINE CLINIC | Facility: CLINIC | Age: 40
End: 2025-02-17
Payer: MEDICAID

## 2025-02-17 VITALS
DIASTOLIC BLOOD PRESSURE: 79 MMHG | OXYGEN SATURATION: 98 % | HEART RATE: 90 BPM | BODY MASS INDEX: 27.16 KG/M2 | WEIGHT: 230 LBS | HEIGHT: 77 IN | SYSTOLIC BLOOD PRESSURE: 126 MMHG

## 2025-02-17 DIAGNOSIS — N50.9 LESION OF SKIN OF SCROTUM: ICD-10-CM

## 2025-02-17 DIAGNOSIS — R16.1 SPLENOMEGALY: ICD-10-CM

## 2025-02-17 DIAGNOSIS — R07.89 CHEST FULLNESS: Primary | ICD-10-CM

## 2025-02-17 LAB
ALBUMIN SERPL-MCNC: 4.5 G/DL (ref 3.5–5.2)
ALBUMIN/GLOB SERPL: 1.7 G/DL
ALP SERPL-CCNC: 76 U/L (ref 39–117)
ALT SERPL W P-5'-P-CCNC: 23 U/L (ref 1–41)
ANION GAP SERPL CALCULATED.3IONS-SCNC: 10.3 MMOL/L (ref 5–15)
AST SERPL-CCNC: 19 U/L (ref 1–40)
BASOPHILS # BLD AUTO: 0.05 10*3/MM3 (ref 0–0.2)
BASOPHILS # BLD MANUAL: 0.05 10*3/MM3 (ref 0–0.2)
BASOPHILS NFR BLD AUTO: 1 % (ref 0–1.5)
BASOPHILS NFR BLD MANUAL: 1 % (ref 0–1.5)
BILIRUB SERPL-MCNC: 0.3 MG/DL (ref 0–1.2)
BUN SERPL-MCNC: 10 MG/DL (ref 6–20)
BUN/CREAT SERPL: 10.3 (ref 7–25)
CALCIUM SPEC-SCNC: 9 MG/DL (ref 8.6–10.5)
CHLORIDE SERPL-SCNC: 106 MMOL/L (ref 98–107)
CO2 SERPL-SCNC: 23.7 MMOL/L (ref 22–29)
CREAT SERPL-MCNC: 0.97 MG/DL (ref 0.76–1.27)
DEPRECATED RDW RBC AUTO: 43.5 FL (ref 37–54)
EGFRCR SERPLBLD CKD-EPI 2021: 101.2 ML/MIN/1.73
EOSINOPHIL # BLD AUTO: 0.17 10*3/MM3 (ref 0–0.4)
EOSINOPHIL # BLD MANUAL: 0.2 10*3/MM3 (ref 0–0.4)
EOSINOPHIL NFR BLD AUTO: 3.4 % (ref 0.3–6.2)
EOSINOPHIL NFR BLD MANUAL: 4 % (ref 0.3–6.2)
ERYTHROCYTE [DISTWIDTH] IN BLOOD BY AUTOMATED COUNT: 13.8 % (ref 12.3–15.4)
GLOBULIN UR ELPH-MCNC: 2.6 GM/DL
GLUCOSE SERPL-MCNC: 121 MG/DL (ref 65–99)
HCT VFR BLD AUTO: 44.4 % (ref 37.5–51)
HGB BLD-MCNC: 14.8 G/DL (ref 13–17.7)
HIV 1+2 AB+HIV1P24 AG SERPLBLD IA.RAPID: NORMAL
HIV 1+2 AB+HIV1P24 AG SERPLBLD IA.RAPID: NORMAL
IMM GRANULOCYTES # BLD AUTO: 0.01 10*3/MM3 (ref 0–0.05)
IMM GRANULOCYTES NFR BLD AUTO: 0.2 % (ref 0–0.5)
LDH SERPL-CCNC: 178 U/L (ref 135–225)
LYMPHOCYTES # BLD AUTO: 1.53 10*3/MM3 (ref 0.7–3.1)
LYMPHOCYTES # BLD MANUAL: 1.82 10*3/MM3 (ref 0.7–3.1)
LYMPHOCYTES NFR BLD AUTO: 31 % (ref 19.6–45.3)
LYMPHOCYTES NFR BLD MANUAL: 4 % (ref 5–12)
MCH RBC QN AUTO: 28.8 PG (ref 26.6–33)
MCHC RBC AUTO-ENTMCNC: 33.3 G/DL (ref 31.5–35.7)
MCV RBC AUTO: 86.4 FL (ref 79–97)
MONOCYTES # BLD AUTO: 0.35 10*3/MM3 (ref 0.1–0.9)
MONOCYTES # BLD: 0.2 10*3/MM3 (ref 0.1–0.9)
MONOCYTES NFR BLD AUTO: 7.1 % (ref 5–12)
NEUTROPHILS # BLD AUTO: 2.66 10*3/MM3 (ref 1.7–7)
NEUTROPHILS NFR BLD AUTO: 2.82 10*3/MM3 (ref 1.7–7)
NEUTROPHILS NFR BLD AUTO: 57.3 % (ref 42.7–76)
NEUTROPHILS NFR BLD MANUAL: 54 % (ref 42.7–76)
PATHOLOGY REVIEW: YES
PLAT MORPH BLD: NORMAL
PLATELET # BLD AUTO: 256 10*3/MM3 (ref 140–450)
PMV BLD AUTO: 10.8 FL (ref 6–12)
POTASSIUM SERPL-SCNC: 3.8 MMOL/L (ref 3.5–5.2)
PROT SERPL-MCNC: 7.1 G/DL (ref 6–8.5)
RBC # BLD AUTO: 5.14 10*6/MM3 (ref 4.14–5.8)
RBC MORPH BLD: NORMAL
SODIUM SERPL-SCNC: 140 MMOL/L (ref 136–145)
VARIANT LYMPHS NFR BLD MANUAL: 37 % (ref 19.6–45.3)
WBC MORPH BLD: NORMAL
WBC NRBC COR # BLD AUTO: 4.93 10*3/MM3 (ref 3.4–10.8)

## 2025-02-17 PROCEDURE — 1159F MED LIST DOCD IN RCRD: CPT | Performed by: STUDENT IN AN ORGANIZED HEALTH CARE EDUCATION/TRAINING PROGRAM

## 2025-02-17 PROCEDURE — 85007 BL SMEAR W/DIFF WBC COUNT: CPT

## 2025-02-17 PROCEDURE — 86644 CMV ANTIBODY: CPT

## 2025-02-17 PROCEDURE — 1126F AMNT PAIN NOTED NONE PRSNT: CPT | Performed by: STUDENT IN AN ORGANIZED HEALTH CARE EDUCATION/TRAINING PROGRAM

## 2025-02-17 PROCEDURE — 86664 EPSTEIN-BARR NUCLEAR ANTIGEN: CPT

## 2025-02-17 PROCEDURE — 86665 EPSTEIN-BARR CAPSID VCA: CPT

## 2025-02-17 PROCEDURE — 83615 LACTATE (LD) (LDH) ENZYME: CPT

## 2025-02-17 PROCEDURE — 80053 COMPREHEN METABOLIC PANEL: CPT

## 2025-02-17 PROCEDURE — G0475 HIV COMBINATION ASSAY: HCPCS

## 2025-02-17 PROCEDURE — 36415 COLL VENOUS BLD VENIPUNCTURE: CPT

## 2025-02-17 PROCEDURE — 99213 OFFICE O/P EST LOW 20 MIN: CPT | Performed by: STUDENT IN AN ORGANIZED HEALTH CARE EDUCATION/TRAINING PROGRAM

## 2025-02-17 PROCEDURE — 86645 CMV ANTIBODY IGM: CPT

## 2025-02-17 PROCEDURE — 85025 COMPLETE CBC W/AUTO DIFF WBC: CPT

## 2025-02-17 PROCEDURE — 1160F RVW MEDS BY RX/DR IN RCRD: CPT | Performed by: STUDENT IN AN ORGANIZED HEALTH CARE EDUCATION/TRAINING PROGRAM

## 2025-02-17 NOTE — PROGRESS NOTES
"Chief Complaint  Skin Problem (MOLES ON GROIN AREA)    Subjective      Carlos Ghosh is a 39 y.o. male who presents to [facility]     Same day visit:  Patient comes as he refers has scrotum lesion. 3 black small lesion over the scrotum. Also, refers, chest fullness after covid 19. No any other major complaints.     Objective   Vital Signs:   Vitals:    02/17/25 0822   BP: 126/79   Pulse: 90   SpO2: 98%   Weight: 104 kg (230 lb)   Height: 195.6 cm (77.01\")     Body mass index is 27.27 kg/m².    Wt Readings from Last 3 Encounters:   02/17/25 104 kg (230 lb)   01/29/25 105 kg (230 lb 14.4 oz)   01/27/25 104 kg (230 lb)     BP Readings from Last 3 Encounters:   02/17/25 126/79   01/29/25 112/84   01/27/25 113/71       Health Maintenance   Topic Date Due    Hepatitis B (1 of 3 - 19+ 3-dose series) Never done    COVID-19 Vaccine (1 - 2024-25 season) Never done    INFLUENZA VACCINE  03/31/2025 (Originally 7/1/2024)    Pneumococcal Vaccine 0-49 (1 of 2 - PCV) 12/18/2025 (Originally 5/12/2003)    TDAP/TD VACCINES (1 - Tdap) 12/18/2025 (Originally 5/12/2003)    BMI FOLLOWUP  03/19/2025    ANNUAL PHYSICAL  11/25/2025    COLORECTAL CANCER SCREENING  12/23/2025    HEPATITIS C SCREENING  Completed    HEMOGLOBIN A1C  Discontinued    DIABETIC EYE EXAM  Discontinued    URINE MICROALBUMIN-CREATININE RATIO (uACR)  Discontinued       Physical Exam  Vitals reviewed.   Constitutional:       Comments: 3 black raised lesion 0.1 cm x0.1 cm    HENT:      Head: Normocephalic.      Mouth/Throat:      Mouth: Mucous membranes are moist.   Eyes:      Pupils: Pupils are equal, round, and reactive to light.   Cardiovascular:      Rate and Rhythm: Normal rate.   Abdominal:      General: Abdomen is flat.   Musculoskeletal:         General: Normal range of motion.      Cervical back: Normal range of motion.   Skin:     General: Skin is warm.      Capillary Refill: Capillary refill takes less than 2 seconds. "   Neurological:      Mental Status: He is alert.       Assessment & Plan  Chest fullness    Orders:    XR Chest 2 View; Future    Lesion of skin of scrotum    Orders:    Ambulatory Referral to Urology               I spent 16 minutes caring for Carlos on this date of service. This time includes time spent by me in the following activities:preparing for the visit, reviewing tests, obtaining and/or reviewing a separately obtained history, performing a medically appropriate examination and/or evaluation, counseling and educating the patient/family/caregiver, ordering medications, tests, or procedures, referring and communicating with other health care professionals, documenting information in the medical record, independently interpreting results and communicating that information with the patient/family/caregiver, care coordination.    FOLLOW UP  No follow-ups on file.  Patient was given instructions and counseling regarding his condition or for health maintenance advice. Please see specific information pulled into the AVS if appropriate.       Johnny Marie MD  02/17/25  08:37 EST    CURRENT & DISCONTINUED MEDICATIONS  Current Outpatient Medications   Medication Instructions    dextromethorphan 30 mg, Oral, 4 Times Daily PRN    Diclofenac Sodium (VOLTAREN) 4 g, Topical, 4 Times Daily PRN    guaifenesin (ROBITUSSIN) 200 mg, Oral, Every 4 Hours PRN    polyethylene glycol-electrolytes (NULYTELY) 420 g solution Colonoscopy in February       There are no discontinued medications.

## 2025-02-18 LAB
CMV IGG SERPL IA-ACNC: <0.6 U/ML (ref 0–0.59)
CMV IGM SERPL IA-ACNC: <30 AU/ML (ref 0–29.9)
CYTO UR: NORMAL
EBV NA IGG SER IA-ACNC: >600 U/ML (ref 0–17.9)
EBV VCA IGG SER IA-ACNC: >600 U/ML (ref 0–17.9)
EBV VCA IGM SER IA-ACNC: <36 U/ML (ref 0–35.9)
LAB AP CASE REPORT: NORMAL
LAB AP CLINICAL INFORMATION: NORMAL
Lab: NORMAL
PATH REPORT.FINAL DX SPEC: NORMAL
PATH REPORT.GROSS SPEC: NORMAL
SERVICE CMNT-IMP: ABNORMAL

## 2025-02-19 NOTE — PRE-PROCEDURE INSTRUCTIONS
"PAT call attempted.  No answer.  Detailed message with date and arrival time of 1200 given.  Instructed that arrival time is not procedure time but allows time to prepare for procedure. Come to entrance \"C\"; must have adult  for transportation home; may have two visitors; however, children under 12 must remain in waiting area; instructed on diet/clear liquids/NPO/bowel prep, if needed; may take normal meds two hours prior to arrival time except for blood thinners, antidiabetics, diuretics, and weight loss meds.  Instructed to return call to confirm receipt of instructions and for any questions.  "

## 2025-02-21 ENCOUNTER — TELEPHONE (OUTPATIENT)
Dept: FAMILY MEDICINE CLINIC | Facility: CLINIC | Age: 40
End: 2025-02-21
Payer: MEDICAID

## 2025-02-21 NOTE — TELEPHONE ENCOUNTER
Patient called wanting to make sure seeing a urologist is the best option. He previously seen dr. Whitaker and was referred to a urologist for the moles in his groin area, he did not know if it was better to see a dermatologist. Please advise.

## 2025-02-24 NOTE — PROGRESS NOTES
Chief Complaint/Care Team   No chief complaint on file.    Grant Gonsalez*  Grant Gonsalez, DO      TELEHEALTH DISCLAIMER  You have chosen to receive care through a video telehealth visit. Do you consent to use a video telehealth visit for your medical care today?  YES  This visit has been scheduled or rescheduled as a video telehealth visit to comply with patient safety & health concerns in accordance with CDC recommendations.    Total time of discussion was 14 minutes.   I was located at [facility] Hem/onc clinic and pt was located at home.      History of Present Illness     Diagnosis: Splenomegaly    Current Treatment: Active Surveillance  Previous Treatment: None    Carlos Ghosh is a 39 y.o. male who presents to [facility]AT[facility] for evaluation of splenomegaly.    further evaluation and treatment of his splenomegaly.    CBC reveals a white blood cell count of 4.4, hemoglobin of 14.9, hematocrit of 43.4, MCV of 86.3, platelet count of 238, normal differential.    Normal uric acid level, PTH is normal, CMP reveals a mildly elevated glucose of 121, normal kidney function and liver function testing, normal calcium and protein level.    Urine from August 2024 is normal  Diagnostic PSA is normal at 0.7 past medical history for benign prostatic hyperplasia with lower urinary tract symptoms followed by Dr. Acosta urologist.    Essential tremor diagnosed and treated by neurologist Dr. Little    CT scan from August 1, 2024 renal stone protocol reveals a 3 mm calcification in the left distal ureter but of note there was mild splenomegaly measuring 16.4 cm    CT scan from August 29, 2024 reveals no urethral stones, liver was normal spleen was noted to be normal in size.  Pancreas and adrenal glands were also normal.    Patient denies any fatigue, night sweats, weight loss.        I have reviewed medical history above.  History of Present Illness  The patient presents via  virtual visit to discuss splenomegaly.    He reports undergoing a CT scan for kidney stone, which showed an enlarged spleen. He underwent an abdominal ultrasound ordered by GILES Coffman which showed his spleen measured 15 x 5 x 14.6, indicating mild enlargement. There were no signs of liver enlargement or masses, and his kidneys appeared normal with no mention of kidney stones. A test for mononucleosis was negative, and a recent COVID-19 test in September 2024 was also negative. A basic CBC collected around [place] time showed normal white count, hemoglobin, and platelets. His white cells were broken down into different subgroups, all of which were normal. His platelet count has been normal for the last 2 years. Electrolytes, kidney function, and liver function were all normal. He is not experiencing any significant left-sided abdominal pain, attributing minor discomfort to gas. He recently had a viral infection and is uncertain if it contributed to his condition. He first became aware of his splenomegaly following a CT scan for kidney stones in August 2024. He reports no palpable masses in his neck, groin, or armpits, and his weight has remained stable. He is 6 feet 5 inches tall and questions whether his height could be a factor in his organ size.    He has a history of kidney stones, having passed one in August 2024 or September 2024. He is considering dietary and exercise modifications to manage his uric acid levels. He maintains a vegetarian diet, abstaining from beef and alcohol, and consumes black beans and green beans.    He has observed elevated uric acid (7.8), glucose (108), AG ratio (2.0), and anion gap (15.8) levels on his blood work and seeks clarification on these findings. He has no history of gout.    SOCIAL HISTORY  He does not drink alcohol.     Interval History: No left upper abdominal pain, no fever, chills, some upper respiratory tract infection in past month, pt with some post-nasal drip  from allergies. Pt with stable weight, no night sweats. No report of fever or other infectious symptoms today. Pt shared he is going to be a father in 3/2025. Pt here to discuss labs ordered to assess for splenomegaly.       Review of Systems     Oncology/Hematology History    No history exists.       Objective     There were no vitals filed for this visit.        No physical exam was performed as this was a telemedicine appointment.    PHQ-9 Total Score:         Physical Exam    Physical Exam        Past Medical History     Past Medical History:   Diagnosis Date    Anxiety disorder 08/28/2018    Chronic bilateral low back pain without sciatica 02/15/2019    Foot pain, left 07/11/2018    Foot pain, left     Foot pain, right 07/11/2018    Onychia of toe 08/13/2018    Pain of left heel     Paronychia of toe 08/13/2018    Tinea unguium 07/11/2018     Current Outpatient Medications on File Prior to Visit   Medication Sig Dispense Refill    dextromethorphan 15 MG/5ML syrup Take 10 mL by mouth 4 (Four) Times a Day As Needed for Cough. 118 mL 0    Diclofenac Sodium (VOLTAREN) 1 % gel gel Apply 4 g topically to the appropriate area as directed 4 (Four) Times a Day As Needed (as needed). 100 g 1    guaifenesin (ROBITUSSIN) 100 MG/5ML liquid Take 10 mL by mouth Every 4 (Four) Hours As Needed for Cough. 180 mL 0    polyethylene glycol-electrolytes (NULYTELY) 420 g solution Colonoscopy in February       No current facility-administered medications on file prior to visit.      No Known Allergies  Past Surgical History:   Procedure Laterality Date    COLONOSCOPY  2015    DR DILLON HATFIELD    EAR TUBES      TONSILLECTOMY       Social History     Socioeconomic History    Marital status: Single   Tobacco Use    Smoking status: Never     Passive exposure: Never    Smokeless tobacco: Never   Vaping Use    Vaping status: Never Used   Substance and Sexual Activity    Alcohol use: Never    Drug use: Never    Sexual activity: Defer      Family History   Problem Relation Age of Onset    Depression Mother     Paranoid behavior Father     OCD Father     Bipolar disorder Father     Early death Father 52        FATHER PASSED AWAY FROM COLON CANCER AT AGE 52    Colon cancer Father         FAMILY HISTORY OF COLON CANCER    No Known Problems Sister     No Known Problems Brother     No Known Problems Maternal Aunt     No Known Problems Paternal Aunt     No Known Problems Maternal Uncle     No Known Problems Paternal Uncle     No Known Problems Maternal Grandfather     Dementia Maternal Grandmother     No Known Problems Paternal Grandfather     No Known Problems Paternal Grandmother     No Known Problems Cousin     No Known Problems Other     ADD / ADHD Neg Hx     Alcohol abuse Neg Hx     Anxiety disorder Neg Hx     Drug abuse Neg Hx     Schizophrenia Neg Hx     Seizures Neg Hx     Self-Injurious Behavior  Neg Hx     Suicide Attempts Neg Hx        Results     Result Review   The following data was reviewed by: Duglas Nieves MD on 12/10/2024:  Lab Results   Component Value Date    HGB 14.8 02/17/2025    HCT 44.4 02/17/2025    MCV 86.4 02/17/2025     02/17/2025    WBC 4.93 02/17/2025    NEUTROABS 2.82 02/17/2025    NEUTROABS 2.66 02/17/2025    LYMPHSABS 1.53 02/17/2025    MONOSABS 0.35 02/17/2025    EOSABS 0.17 02/17/2025    EOSABS 0.20 02/17/2025    BASOSABS 0.05 02/17/2025    BASOSABS 0.05 02/17/2025     Lab Results   Component Value Date    GLUCOSE 121 (H) 02/17/2025    BUN 10 02/17/2025    CREATININE 0.97 02/17/2025     02/17/2025    K 3.8 02/17/2025     02/17/2025    CO2 23.7 02/17/2025    CALCIUM 9.0 02/17/2025    PROTEINTOT 7.1 02/17/2025    ALBUMIN 4.5 02/17/2025    BILITOT 0.3 02/17/2025    ALKPHOS 76 02/17/2025    AST 19 02/17/2025    ALT 23 02/17/2025     Lab Results   Component Value Date    MG 2.1 06/19/2023    FREET4 1.26 06/19/2023    TSH 1.600 12/23/2024       No radiology results for the last day       Assessment & Plan      Diagnoses and all orders for this visit:    1. Splenomegaly (Primary)         Carlos Ghosh is a 39 y.o. male who presents to [facility]AT[facility] for splenomegaly.      Assessment & Plan  1. Splenomegaly.  Discussed pts most recent laboratory results CBC, CMP were within normal limits, including white count, hemoglobin, platelets, electrolytes, kidney function, and liver function. The abdominal  ultrasound showed an enlarged spleen measuring 15 x 5 x 14.6 cm, with no other abnormalities noted. The patient has no B symptoms such as fever, night sweats, or weight loss. Additional blood tests will be ordered to check for viral infections (CMV, EBV, hepatitis, HIV) and a blood smear and flow cytometry to rule out underlying conditions like leukemia. He is advised to report any new symptoms such as fever, masses, or night sweats.     2/25/2025: discussed results of labs from 2/17/2025, peripheral blood smear was normal, flow cytometry was negative for hematological malignancy, HIV test was negative, pt with evidence of EBV infection in the past, pt with normal CBC and CMP, pt without B symptoms or pain from splenomegaly, plan to recheck labs and US of abdomen to monitor his splenomegaly in 6 months.             Follow-up  The patient will follow up in 6 months with labs CBC, CMP, Acute hepatitis panel and US of abdomen.       Please note that portions of this note were completed with a voice recognition program.      Electronically signed by Duglas Nieves MD, 02/25/25, 10:36 AM EST.      Follow Up     I spent 30 minutes caring for Carlos on this date of service. This time includes time spent by me in the following activities:preparing for the visit, reviewing tests, obtaining and/or reviewing a separately obtained history, performing a medically appropriate examination and/or evaluation , counseling and educating the patient/family/caregiver, ordering medications, tests, or procedures,  referring and communicating with other health care professionals , documenting information in the medical record, independently interpreting results and communicating that information with the patient/family/caregiver, and care coordination    Any chemotherapy or immunotherapy or other systemic therapy treatment plan involves a high risk of complications and/or mortality of patient management.    The patient was seen and examined. Work by the provider also included review and/or ordering of lab tests, review and/or ordering of radiology tests, review and/or ordering of medicine tests, discussion with other physicians or providers, independent review of data, obtaining old records, review/summation of old records, and/or other review.    I have reviewed the family history, social history, and past medical history for this patient. Previous information and data has been reviewed and updated as needed. I have reviewed and verified the chief complaint, history, and other documentation. The patient was interviewed and examined in the clinic and the chart reviewed. The previous observations, recommendations, and conclusions were reviewed including those of other providers.     The plan was discussed with the patient and/or family. The patient was given time to ask questions and these questions were answered. At the conclusion of their visit they had no additional questions or concerns and all questions were answered to their satisfaction.    Patient was given instructions and counseling regarding his condition or for health maintenance advice. Please see specific information pulled into the AVS if appropriate.       Patient or patient representative verbalized consent for the use of Ambient Listening during the visit with  Duglas Nieves MD for chart documentation. 2/25/2025  12:56 EST

## 2025-02-25 ENCOUNTER — ANESTHESIA EVENT (OUTPATIENT)
Dept: GASTROENTEROLOGY | Facility: HOSPITAL | Age: 40
End: 2025-02-25
Payer: MEDICAID

## 2025-02-25 ENCOUNTER — TELEPHONE (OUTPATIENT)
Dept: ONCOLOGY | Facility: HOSPITAL | Age: 40
End: 2025-02-25

## 2025-02-25 ENCOUNTER — TELEMEDICINE (OUTPATIENT)
Dept: ONCOLOGY | Facility: HOSPITAL | Age: 40
End: 2025-02-25
Payer: MEDICAID

## 2025-02-25 DIAGNOSIS — R16.1 SPLENOMEGALY: Primary | ICD-10-CM

## 2025-02-25 NOTE — ANESTHESIA PREPROCEDURE EVALUATION
Anesthesia Evaluation     NPO Solid Status: > 8 hours  NPO Liquid Status: > 2 hours           Airway   Mallampati: III  TM distance: >3 FB  Neck ROM: full  Possible difficult intubation and Small opening  Dental - normal exam     Pulmonary - normal exam   (+) ,sleep apnea (no formal diagnosis but patient state he at times wakes up gasping)  Cardiovascular - normal exam    ECG reviewed        Neuro/Psych  (+) headaches, psychiatric history Anxiety  GI/Hepatic/Renal/Endo    (+) obesity, GERD, renal disease- stones    Musculoskeletal     Abdominal    Substance History      OB/GYN          Other        ROS/Med Hx Other: Lower abd pain, change in bowel habits     EKG 08/19/23:   Sinus rhythm  Borderline T wave abnormalities  No previous ECG available for comparison                  Anesthesia Plan    ASA 2     general   total IV anesthesia  (Total IV Anesthesia    Patient understands anesthesia not responsible for dental damage.      Discussed risks with pt including aspiration, allergic reactions, apnea, advanced airway placement. Pt verbalized understanding. All questions answered.     )  intravenous induction     Anesthetic plan, risks, benefits, and alternatives have been provided, discussed and informed consent has been obtained with: patient.    Plan discussed with CRNA.      CODE STATUS:

## 2025-02-25 NOTE — TELEPHONE ENCOUNTER
Caller: Carlos Ghosh    Relationship: Self    Best call back number: [phone]     What is the best time to reach you: ASAP    Who are you requesting to speak with (clinical staff, provider,  specific staff member): SCHEDULING      What was the call regarding: PT JUST HAD APPT WITH DR GREEN AND NEEDED TO MAKE A FOLLOW UP, WAS NOT SURE IF THE OFFICE WILL BE CONTACTING HIM TO SCHEDULE OR IF HE NEEDED TO CALL.  PLEASE CALL PT TO ADVISE/SCHEDULE ACCORDINGLY.

## 2025-02-26 ENCOUNTER — HOSPITAL ENCOUNTER (OUTPATIENT)
Facility: HOSPITAL | Age: 40
Setting detail: HOSPITAL OUTPATIENT SURGERY
Discharge: HOME | End: 2025-02-26
Attending: INTERNAL MEDICINE | Admitting: INTERNAL MEDICINE
Payer: MEDICAID

## 2025-02-26 ENCOUNTER — ANESTHESIA (OUTPATIENT)
Dept: GASTROENTEROLOGY | Facility: HOSPITAL | Age: 40
End: 2025-02-26
Payer: MEDICAID

## 2025-02-26 VITALS
HEART RATE: 85 BPM | SYSTOLIC BLOOD PRESSURE: 107 MMHG | BODY MASS INDEX: 27.08 KG/M2 | WEIGHT: 228.4 LBS | RESPIRATION RATE: 23 BRPM | DIASTOLIC BLOOD PRESSURE: 74 MMHG | OXYGEN SATURATION: 93 % | TEMPERATURE: 98.5 F

## 2025-02-26 PROCEDURE — 25010000002 LIDOCAINE PF 2% 2 % SOLUTION: Performed by: NURSE ANESTHETIST, CERTIFIED REGISTERED

## 2025-02-26 PROCEDURE — 25810000003 LACTATED RINGERS PER 1000 ML: Performed by: INTERNAL MEDICINE

## 2025-02-26 PROCEDURE — 25010000002 PROPOFOL 10 MG/ML EMULSION: Performed by: NURSE ANESTHETIST, CERTIFIED REGISTERED

## 2025-02-26 RX ORDER — PROPOFOL 10 MG/ML
VIAL (ML) INTRAVENOUS AS NEEDED
Status: DISCONTINUED | OUTPATIENT
Start: 2025-02-26 | End: 2025-02-26 | Stop reason: SURG

## 2025-02-26 RX ORDER — SODIUM CHLORIDE, SODIUM LACTATE, POTASSIUM CHLORIDE, CALCIUM CHLORIDE 600; 310; 30; 20 MG/100ML; MG/100ML; MG/100ML; MG/100ML
1000 INJECTION, SOLUTION INTRAVENOUS CONTINUOUS
Status: DISCONTINUED | OUTPATIENT
Start: 2025-02-26 | End: 2025-02-26 | Stop reason: HOSPADM

## 2025-02-26 RX ORDER — LIDOCAINE HYDROCHLORIDE 20 MG/ML
INJECTION, SOLUTION EPIDURAL; INFILTRATION; INTRACAUDAL; PERINEURAL AS NEEDED
Status: DISCONTINUED | OUTPATIENT
Start: 2025-02-26 | End: 2025-02-26 | Stop reason: SURG

## 2025-02-26 RX ADMIN — PROPOFOL 50 MG: 10 INJECTION, EMULSION INTRAVENOUS at 14:21

## 2025-02-26 RX ADMIN — SODIUM CHLORIDE, POTASSIUM CHLORIDE, SODIUM LACTATE AND CALCIUM CHLORIDE: 600; 310; 30; 20 INJECTION, SOLUTION INTRAVENOUS at 14:19

## 2025-02-26 RX ADMIN — PROPOFOL 250 MCG/KG/MIN: 10 INJECTION, EMULSION INTRAVENOUS at 14:22

## 2025-02-26 RX ADMIN — LIDOCAINE HYDROCHLORIDE 40 MG: 20 INJECTION, SOLUTION INTRAVENOUS at 14:21

## 2025-02-26 NOTE — ANESTHESIA POSTPROCEDURE EVALUATION
Patient: Carlos Ghosh    Procedure Summary       Date: 02/26/25 Room / Location: [facility] ENDOSCOPY 1 / [facility] ENDOSCOPY    Anesthesia Start: 1419 Anesthesia Stop: 1502    Procedure: COLONOSCOPY Diagnosis:       Dark stools      Lower abdominal pain      Change in bowel habits      FH: colon cancer      (Dark stools [R19.5])      (Lower abdominal pain [R10.30])      (Change in bowel habits [R19.4])      (FH: colon cancer [Z80.0])    Surgeons: Cira Montanez MD Provider: Cristy Spicer CRNA    Anesthesia Type: general ASA Status: 2            Anesthesia Type: general    Vitals  Vitals Value Taken Time   /75 02/26/25 1459   Temp 36.9 °C (98.4 °F) 02/26/25 1458   Pulse 87 02/26/25 1503   Resp 20 02/26/25 1458   SpO2 93 % 02/26/25 1503   Vitals shown include unfiled device data.        Post Anesthesia Care and Evaluation    Patient location during evaluation: bedside  Patient participation: complete - patient participated  Level of consciousness: awake  Pain management: adequate    Airway patency: patent  Anesthetic complications: No anesthetic complications  PONV Status: controlled  Cardiovascular status: acceptable and stable  Respiratory status: acceptable

## 2025-02-26 NOTE — H&P
Pre Procedure History & Physical    Chief Complaint:   Lower abd pain, change in bowel habits, constipation    Subjective     HPI:   40 yo M here for eval of lower abd pain, change in bowel habits, constipation.    Past Medical History:   Past Medical History:   Diagnosis Date    Anxiety disorder 08/28/2018    Chronic bilateral low back pain without sciatica 02/15/2019    Foot pain, left 07/11/2018    Foot pain, left     Foot pain, right 07/11/2018    Onychia of toe 08/13/2018    Pain of left heel     Paronychia of toe 08/13/2018    Tinea unguium 07/11/2018       Past Surgical History:  Past Surgical History:   Procedure Laterality Date    COLONOSCOPY  2015    DR DILLON HATFIELD    EAR TUBES      TONSILLECTOMY         Family History:  Family History   Problem Relation Age of Onset    Depression Mother     Paranoid behavior Father     OCD Father     Bipolar disorder Father     Early death Father 52        FATHER PASSED AWAY FROM COLON CANCER AT AGE 52    Colon cancer Father         FAMILY HISTORY OF COLON CANCER    No Known Problems Sister     No Known Problems Brother     No Known Problems Maternal Aunt     No Known Problems Paternal Aunt     No Known Problems Maternal Uncle     No Known Problems Paternal Uncle     No Known Problems Maternal Grandfather     Dementia Maternal Grandmother     No Known Problems Paternal Grandfather     No Known Problems Paternal Grandmother     No Known Problems Cousin     No Known Problems Other     ADD / ADHD Neg Hx     Alcohol abuse Neg Hx     Anxiety disorder Neg Hx     Drug abuse Neg Hx     Schizophrenia Neg Hx     Seizures Neg Hx     Self-Injurious Behavior  Neg Hx     Suicide Attempts Neg Hx        Social History:   reports that he has never smoked. He has never been exposed to tobacco smoke. He has never used smokeless tobacco. He reports that he does not drink alcohol and does not use drugs.    Medications:   Medications Prior to Admission   Medication Sig Dispense Refill  Last Dose/Taking    dextromethorphan 15 MG/5ML syrup Take 10 mL by mouth 4 (Four) Times a Day As Needed for Cough. 118 mL 0     Diclofenac Sodium (VOLTAREN) 1 % gel gel Apply 4 g topically to the appropriate area as directed 4 (Four) Times a Day As Needed (as needed). 100 g 1     guaifenesin (ROBITUSSIN) 100 MG/5ML liquid Take 10 mL by mouth Every 4 (Four) Hours As Needed for Cough. 180 mL 0     polyethylene glycol-electrolytes (NULYTELY) 420 g solution Colonoscopy in February          Allergies:  Patient has no known allergies.    ROS:    Pertinent items are noted in HPI     Objective     Weight 104 kg (228 lb 6.3 oz).    Physical Exam   Constitutional: Pt is oriented to person, place, and time and well-developed, well-nourished, and in no distress.   Mouth/Throat: Oropharynx is clear and moist.   Neck: Normal range of motion.   Cardiovascular: Normal rate, regular rhythm and normal heart sounds.    Pulmonary/Chest: Effort normal and breath sounds normal.   Abdominal: Soft. Nontender  Skin: Skin is warm and dry.   Psychiatric: Mood, memory, affect and judgment normal.     Assessment & Plan     Diagnosis:  Lower abd pain, change in bowel habits, constipation    Anticipated Surgical Procedure:  Colonoscopy    The risks, benefits, and alternatives of this procedure have been discussed with the patient or the responsible party- the patient understands and agrees to proceed.

## 2025-02-27 ENCOUNTER — TELEPHONE (OUTPATIENT)
Dept: GASTROENTEROLOGY | Facility: CLINIC | Age: 40
End: 2025-02-27
Payer: MEDICAID

## 2025-02-27 ENCOUNTER — TELEPHONE (OUTPATIENT)
Dept: FAMILY MEDICINE CLINIC | Facility: CLINIC | Age: 40
End: 2025-02-27

## 2025-02-27 DIAGNOSIS — R79.81 LOW O2 SATURATION: Primary | ICD-10-CM

## 2025-02-27 NOTE — TELEPHONE ENCOUNTER
Caller: Carlos Ghosh    Relationship: Self    Best call back number: [phone]    What are you requesting: SLEEP STUDY     Any additional details: PATIENT HAD A COLONOSCOPY AND HIS OXYGEN DROPPED DURING THE PROCEDURE AND DOCTOR TOLD PATIENT TO REACH OUT TO HIS PCP TO GET A SLEEP STUDY SCHEDULED.       PATIENT IS REQUESTING A CALL BACK.

## 2025-03-12 ENCOUNTER — TELEPHONE (OUTPATIENT)
Dept: PSYCHIATRY | Facility: CLINIC | Age: 40
End: 2025-03-12
Payer: MEDICAID

## 2025-03-12 ENCOUNTER — OFFICE VISIT (OUTPATIENT)
Dept: SLEEP MEDICINE | Facility: HOSPITAL | Age: 40
End: 2025-03-12
Payer: MEDICAID

## 2025-03-12 VITALS
DIASTOLIC BLOOD PRESSURE: 74 MMHG | BODY MASS INDEX: 27.16 KG/M2 | SYSTOLIC BLOOD PRESSURE: 111 MMHG | WEIGHT: 230 LBS | OXYGEN SATURATION: 96 % | HEART RATE: 88 BPM | HEIGHT: 77 IN

## 2025-03-12 DIAGNOSIS — R29.818 SUSPECTED SLEEP APNEA: Primary | ICD-10-CM

## 2025-03-12 DIAGNOSIS — R06.83 SNORING: ICD-10-CM

## 2025-03-12 DIAGNOSIS — F41.9 ANXIETY: ICD-10-CM

## 2025-03-12 DIAGNOSIS — E66.3 OVERWEIGHT (BMI 25.0-29.9): ICD-10-CM

## 2025-03-12 PROCEDURE — 1160F RVW MEDS BY RX/DR IN RCRD: CPT | Performed by: INTERNAL MEDICINE

## 2025-03-12 PROCEDURE — G0463 HOSPITAL OUTPT CLINIC VISIT: HCPCS

## 2025-03-12 PROCEDURE — 1159F MED LIST DOCD IN RCRD: CPT | Performed by: INTERNAL MEDICINE

## 2025-03-12 NOTE — TELEPHONE ENCOUNTER
PATIENT WAS SEEN ON 07/16/2024, PATIENT WAS REFERRED OUT FOR NEUROPSYCH TESTING, PATIENT HAS NOT BEEN SEEN SINCE, CLOSING OUT REFERRAL ORDER.

## 2025-03-12 NOTE — PROGRESS NOTES
Sleep Consultation    Patient Name: Carlos Ghosh  Age/Sex: 39 y.o. male  : 1985  MRN: [id number]    Date of Encounter Visit: 2025  Encounter Provider: Camden Shelley MD  Referring Provider: Grant Gonsalez,*  Place of Service: [facility]  Patient Care Team:  Grant Gonsalez DO as PCP - General (Family Medicine)  Lionel Acosta MD as Consulting Physician (Urology)  Vangie Burns APRN as Nurse Practitioner (Nurse Practitioner)  Makayla Ibanez MD (Inactive) as Consulting Physician (Urology)    Subjective:     Reason for Consult: Evaluation of HILARY    History of Present Illness:  Carlos Ghosh is a 39 y.o. male is here for evaluation of HILARY due to suggestive symptoms.  He had a recent colonoscopy and was noted to have significant desaturation while recovering from the anesthesia and was instructed by the MD to get his Sleep evaluated     Patient denies any significant daytime fatigue and sleepiness with an [place] Sleepiness Scale (ESS) of 0.  Patient complains of waking up gasping for breath and waking up with a dry mouth, and overall restless sleep with frequent nightmares  Denies any symptoms of restless leg syndrome.   Patient denies any cataplexy, sleep paralysis or other symptoms to suggest narcolepsy.  Patient denies any parasomnias.  Denies any history of seizure disorder or recent head trauma.  Patient spends adequate amount of time in bed with no evidence of sleep restriction or improper sleep hygiene.  Typical bedtime is around 11 PM-midnight wake up time between 8 and 9 in the morning  Sleep onset within normal range and he does wake up feeling okay  Caffeine intake is usually minimal to none, no smoking alcohol or illicit substance abuse  Comorbidities include:  Anxiety, essential tremors    Review of Systems:   A twelve-system review was conducted and was negative except for the following: Postnasal drainage, neck pain, anxiety  and frequent heartburn and nocturnal reflux symptoms.        Past Medical History:  Past Medical History:   Diagnosis Date    Anxiety disorder 08/28/2018    Chronic bilateral low back pain without sciatica 02/15/2019    Foot pain, left 07/11/2018    Foot pain, left     Foot pain, right 07/11/2018    Onychia of toe 08/13/2018    Pain of left heel     Paronychia of toe 08/13/2018    Tinea unguium 07/11/2018       Past Surgical History:   Procedure Laterality Date    COLONOSCOPY  2015    DR DILLON HATFIELD    COLONOSCOPY N/A 2/26/2025    Procedure: COLONOSCOPY;  Surgeon: Cira Montanez MD;  Location: [facility] ENDOSCOPY;  Service: Gastroenterology;  Laterality: N/A;  HEMORRHOIDS    EAR TUBES      TONSILLECTOMY         Home Medications:     Current Outpatient Medications:     dextromethorphan 15 MG/5ML syrup, Take 10 mL by mouth 4 (Four) Times a Day As Needed for Cough. (Patient not taking: Reported on 3/12/2025), Disp: 118 mL, Rfl: 0    Diclofenac Sodium (VOLTAREN) 1 % gel gel, Apply 4 g topically to the appropriate area as directed 4 (Four) Times a Day As Needed (as needed). (Patient not taking: Reported on 3/12/2025), Disp: 100 g, Rfl: 1    guaifenesin (ROBITUSSIN) 100 MG/5ML liquid, Take 10 mL by mouth Every 4 (Four) Hours As Needed for Cough. (Patient not taking: Reported on 3/12/2025), Disp: 180 mL, Rfl: 0    Allergies:  No Known Allergies    Past Social History:  Social History     Socioeconomic History    Marital status: Single   Tobacco Use    Smoking status: Never     Passive exposure: Never    Smokeless tobacco: Never   Vaping Use    Vaping status: Never Used   Substance and Sexual Activity    Alcohol use: Never    Drug use: Never    Sexual activity: Defer       Past Family History:  Family History   Problem Relation Age of Onset    Depression Mother     Paranoid behavior Father     OCD Father     Bipolar disorder Father     Early death Father 52        FATHER PASSED AWAY FROM COLON CANCER AT AGE 52     "Colon cancer Father         FAMILY HISTORY OF COLON CANCER    No Known Problems Sister     No Known Problems Brother     No Known Problems Maternal Aunt     No Known Problems Paternal Aunt     No Known Problems Maternal Uncle     No Known Problems Paternal Uncle     No Known Problems Maternal Grandfather     Dementia Maternal Grandmother     No Known Problems Paternal Grandfather     No Known Problems Paternal Grandmother     No Known Problems Cousin     No Known Problems Other     ADD / ADHD Neg Hx     Alcohol abuse Neg Hx     Anxiety disorder Neg Hx     Drug abuse Neg Hx     Schizophrenia Neg Hx     Seizures Neg Hx     Self-Injurious Behavior  Neg Hx     Suicide Attempts Neg Hx      Negative for benedict   Objective:        Vital Signs:   Visit Vitals  /74 (BP Location: Right arm, Patient Position: Sitting)   Pulse 88   Ht 195.6 cm (77.01\")   Wt 104 kg (230 lb)   SpO2 96%   BMI 27.27 kg/m²     Wt Readings from Last 3 Encounters:   03/12/25 104 kg (230 lb)   02/26/25 104 kg (228 lb 6.3 oz)   02/17/25 104 kg (230 lb)     Neck Circumference: 13.5 inches    Physical Exam:   GEN:  No acute distress, alert, cooperative, well developed   EYES:   Sclerae clear. No icterus. PERRL. Normal EOM  ENT:   External ears/nose normal, no oral lesions, no thrush, mucous membranes moist, Septum midline. Mallampati IV airway. Large uvula    NECK:  Supple, midline trachea, no JVD  LUNGS: Normal chest on inspection, CTAB, no wheezes. No rhonchi. No crackles. Respirations regular, even and unlabored.   CV:  Regular rhythm and rate. Normal S1/S2. No murmurs, gallops, or rubs noted.  ABD:  Soft, nontender and nondistended. Normal bowel sounds. No guarding  EXT:  Moves all extremities well. No cyanosis. No redness. No edema.   Skin: Dry, intact, no bleeding      Diagnostic Data:  No prior sleep study on records    Assessment and Plan:       ICD-10-CM ICD-9-CM   1. Suspected sleep apnea  R29.818 781.99   2. Anxiety  F41.9 300.00   3. " Overweight (BMI 25.0-29.9)  E66.3 278.02   4. Snoring  R06.83 786.09       Recommendations:     Patient had had an episode of witnessed apnea with hypoxemia after his endoscopy which could be an exaggerated response triggered by the propofol and the sedation used during the endoscopy  Outside of that incident he does have some episode of waking up gasping for breath but he does not have a lot of the other symptoms to strongly consider obstructive sleep apnea and the most likely diagnosis and for that purpose we will evaluate with a home sleep study and will follow-up afterwards to discuss the results and consider further measures  Patient reported having acid reflux with some nocturnal reflux episodes, proper education was provided about the need to avoid any eating before bedtime and the potential benefit from elevating the head of the bed    Patient was educated in depth about HILARY and cardiovascular consequences if left untreated, including but not limited to CHF, CAD, arrhythmias, CVA, and/or HTN. Education also provided about the diagnostic tools for HILARY, including the polysomnography and the treatment modalities, including the CPAP.     If patient has obstructive sleep apnea the recommend treatment is CPAP and will start CPAP and patient will follow up within 31-90 days after starting CPAP for compliance review.   Will address alternative treatment option if intolerant to CPAP     Adherence to the CPAP is a key factor in successful treatment of HILARY and the patient was encouraged to contact us in case of problem with the CPAP or the mask that can limit the tolerance of the compliance with the therapy.    Patient was educated about the impact of obesity on sleep apnea and the benefit of weight loss and weight loss was recommended    Orders Placed This Encounter   Procedures    Home Sleep Study     No orders of the defined types were placed in this encounter.     Return for Follow up after study.    Camden Shelley,  MD [facility]   03/12/25  15:04 EDT    Dictated utilizing Dragon dictation

## 2025-04-20 PROBLEM — N20.0 NEPHROLITHIASIS: Status: ACTIVE | Noted: 2025-04-20

## 2025-04-20 NOTE — PROGRESS NOTES
Chief Complaint: Urologic complaint    Subjective         History of Present Illness  Carlos Ghosh is a 39 y.o. male       BPH  Nephrolithiasis        Comes in today with scrotal lesions    Voiding  ok    .No prostate meds    2/25 0.9   3/24 PTH - 54  12/24 uric acid - 7.8    Passed a small stone in 2024 8/24 CT abdomen/pelvis without - punctate 2 mm stone posterior aspect mid to lower left kidney.    No GH      PVR    2/23 000        No cardiopulmonary history.  Non-smoker.  No anticoagulation      4 stones passed spontaneously      Previous    11/22  Had slower stream/  Perineal pain and SP pain  - minor - stopped after a few weeks.     11/22      0.9,   10/22 KUB-negative  10/22 UA  -  neg  7/21 UA-6-12 RBCs          No urologic family history, father with colorectal cancer,  no history of urologic surgery.       PSA    3/24     0.74  2023    0.74      Objective     Normal scrotum other than some small telangiectasias.  No signs of abnormality otherwise          Current Outpatient Medications:     dextromethorphan 15 MG/5ML syrup, Take 10 mL by mouth 4 (Four) Times a Day As Needed for Cough. (Patient not taking: Reported on 3/12/2025), Disp: 118 mL, Rfl: 0    Diclofenac Sodium (VOLTAREN) 1 % gel gel, Apply 4 g topically to the appropriate area as directed 4 (Four) Times a Day As Needed (as needed). (Patient not taking: Reported on 3/12/2025), Disp: 100 g, Rfl: 1    guaifenesin (ROBITUSSIN) 100 MG/5ML liquid, Take 10 mL by mouth Every 4 (Four) Hours As Needed for Cough. (Patient not taking: Reported on 3/12/2025), Disp: 180 mL, Rfl: 0    No Known Allergies                 Assessment and Plan    Diagnoses and all orders for this visit:    1. Nephrolithiasis (Primary)    2. Benign prostatic hyperplasia with lower urinary tract symptoms, symptom details unspecified          BPH      Doing well.            Scrotal lesion      Telangiectasia of the scrotum patient given reassurance no pathology,  no workup needed.         Nephrolithiasis    The patient was counseled on the preventative measures of kidney stones today.  This included increasing fluid intake to make at least 1.5 ml daily, decreasing meat intake, decreasing salt intake and taking in a normal amount of calcium (1000 mg daily).  Information handout given on this today.         We also discussed the DASH diet today for stone prevention and handout was given      Follow-up as needed

## 2025-04-22 ENCOUNTER — TELEPHONE (OUTPATIENT)
Dept: UROLOGY | Age: 40
End: 2025-04-22

## 2025-04-22 ENCOUNTER — OFFICE VISIT (OUTPATIENT)
Dept: UROLOGY | Age: 40
End: 2025-04-22
Payer: MEDICAID

## 2025-04-22 VITALS — HEIGHT: 77 IN | WEIGHT: 230 LBS | BODY MASS INDEX: 27.16 KG/M2

## 2025-04-22 DIAGNOSIS — N40.1 BENIGN PROSTATIC HYPERPLASIA WITH LOWER URINARY TRACT SYMPTOMS, SYMPTOM DETAILS UNSPECIFIED: ICD-10-CM

## 2025-04-22 DIAGNOSIS — N20.0 NEPHROLITHIASIS: Primary | ICD-10-CM

## 2025-04-22 PROCEDURE — 99213 OFFICE O/P EST LOW 20 MIN: CPT | Performed by: UROLOGY

## 2025-04-22 PROCEDURE — 1159F MED LIST DOCD IN RCRD: CPT | Performed by: UROLOGY

## 2025-04-22 PROCEDURE — 1160F RVW MEDS BY RX/DR IN RCRD: CPT | Performed by: UROLOGY

## 2025-04-22 NOTE — TELEPHONE ENCOUNTER
PATIENT HAS AN APPOINTMENT TODAY FOR DR. OH TO LOOK AT SOMETHING ON HIS SKIN, AND HE WANTS TO KNOW IF WE WILL SCAN HIS BLADDER.  HE IS HAVING BACK ISSUES.  IF WE ARE, HE WILL CHANGE INTO COMFORTABLE CLOTHES, SO HE CAN GET IN RIGHT POSITION.     LMOM IF HE DOESN'T ANSWER.    #[phone]

## 2025-04-22 NOTE — TELEPHONE ENCOUNTER
I CALLED THE PATIENT AND LEFT DETAILED VOICE MESSAGE, PER AYAZ:YES WE CAN DO A BLADDER SCAN IF HE IS HAVING DIFFICULTY EMPTYING BLADDER.

## 2025-05-02 RX ORDER — VALACYCLOVIR HYDROCHLORIDE 1 G/1
1000 TABLET, FILM COATED ORAL 2 TIMES DAILY
Qty: 20 TABLET | Refills: 0 | Status: SHIPPED | OUTPATIENT
Start: 2025-05-02 | End: 2025-05-12

## 2025-05-02 RX ORDER — VALACYCLOVIR HYDROCHLORIDE 1 G/1
1000 TABLET, FILM COATED ORAL 2 TIMES DAILY
Qty: 20 TABLET | Refills: 0 | Status: CANCELLED | OUTPATIENT
Start: 2025-05-02 | End: 2025-05-12

## 2025-05-02 NOTE — TELEPHONE ENCOUNTER
Caller: Carlos Ghosh    Relationship: Self    Best call back number: [phone]    Requested Prescriptions:   Requested Prescriptions     Pending Prescriptions Disp Refills    valACYclovir (Valtrex) 1000 MG tablet 20 tablet 0     Sig: Take 1 tablet by mouth 2 (Two) Times a Day for 10 days.        Pharmacy where request should be sent: [facility]/PHARMACY #[id number] - [place], [place] - [address] [facility] - [phone] [facility] [phone] FX     Last office visit with prescribing clinician: 2/17/2025   Last telemedicine visit with prescribing clinician: Visit date not found   Next office visit with prescribing clinician: Visit date not found     Additional details provided by patient: PATIENT WAS PRESCRIBED THIS MEDICATION BY DOCTOR LEONARDO CHIU IN SEPTEMBER FOR A SHINGLES OUTBREAK. PATIENT STATES WHEN HE WOKE UP TODAY HE STARTED HAVING THE SAME SENSATION AND SMALL RASH ON THE OTHER SIDE OF HIS BODY AND WAS TOLD IF THIS CAME BACK TO CALL AND LET PROVIDER KNOW.     Does the patient have less than a 3 day supply:  [x] Yes  [] No    Would you like a call back once the refill request has been completed: [] Yes [x] No    If the office needs to give you a call back, can they leave a voicemail: [] Yes [x] No    Stas Burger Rep   05/02/25 12:54 EDT

## 2025-06-27 ENCOUNTER — LAB (OUTPATIENT)
Dept: LAB | Facility: HOSPITAL | Age: 40
End: 2025-06-27
Payer: MEDICAID

## 2025-06-27 ENCOUNTER — TRANSCRIBE ORDERS (OUTPATIENT)
Dept: ADMINISTRATIVE | Facility: HOSPITAL | Age: 40
End: 2025-06-27
Payer: MEDICAID

## 2025-06-27 DIAGNOSIS — N20.0 URIC ACID NEPHROLITHIASIS: ICD-10-CM

## 2025-06-27 DIAGNOSIS — E79.0 URICACIDEMIA: ICD-10-CM

## 2025-06-27 DIAGNOSIS — N20.0 URIC ACID NEPHROLITHIASIS: Primary | ICD-10-CM

## 2025-06-27 LAB
25(OH)D3 SERPL-MCNC: 17.9 NG/ML (ref 30–100)
ALBUMIN SERPL-MCNC: 4.4 G/DL (ref 3.5–5.2)
ANION GAP SERPL CALCULATED.3IONS-SCNC: 12.9 MMOL/L (ref 5–15)
BASOPHILS # BLD AUTO: 0.04 10*3/MM3 (ref 0–0.2)
BASOPHILS NFR BLD AUTO: 0.7 % (ref 0–1.5)
BILIRUB UR QL STRIP: NEGATIVE
BUN SERPL-MCNC: 7 MG/DL (ref 6–20)
BUN/CREAT SERPL: 7.4 (ref 7–25)
CALCIUM SPEC-SCNC: 9 MG/DL (ref 8.6–10.5)
CHLORIDE SERPL-SCNC: 106 MMOL/L (ref 98–107)
CLARITY UR: CLEAR
CO2 SERPL-SCNC: 23.1 MMOL/L (ref 22–29)
COLOR UR: YELLOW
CREAT SERPL-MCNC: 0.95 MG/DL (ref 0.76–1.27)
CREAT UR-MCNC: 99.2 MG/DL
DEPRECATED RDW RBC AUTO: 42 FL (ref 37–54)
EGFRCR SERPLBLD CKD-EPI 2021: 103.1 ML/MIN/1.73
EOSINOPHIL # BLD AUTO: 0.19 10*3/MM3 (ref 0–0.4)
EOSINOPHIL NFR BLD AUTO: 3.6 % (ref 0.3–6.2)
ERYTHROCYTE [DISTWIDTH] IN BLOOD BY AUTOMATED COUNT: 13.1 % (ref 12.3–15.4)
GLUCOSE SERPL-MCNC: 104 MG/DL (ref 65–99)
GLUCOSE UR STRIP-MCNC: NEGATIVE MG/DL
HCT VFR BLD AUTO: 45.3 % (ref 37.5–51)
HGB BLD-MCNC: 15.1 G/DL (ref 13–17.7)
HGB UR QL STRIP.AUTO: NEGATIVE
IMM GRANULOCYTES # BLD AUTO: 0.01 10*3/MM3 (ref 0–0.05)
IMM GRANULOCYTES NFR BLD AUTO: 0.2 % (ref 0–0.5)
KETONES UR QL STRIP: NEGATIVE
LEUKOCYTE ESTERASE UR QL STRIP.AUTO: NEGATIVE
LYMPHOCYTES # BLD AUTO: 1.77 10*3/MM3 (ref 0.7–3.1)
LYMPHOCYTES NFR BLD AUTO: 33.1 % (ref 19.6–45.3)
MCH RBC QN AUTO: 29.6 PG (ref 26.6–33)
MCHC RBC AUTO-ENTMCNC: 33.3 G/DL (ref 31.5–35.7)
MCV RBC AUTO: 88.8 FL (ref 79–97)
MONOCYTES # BLD AUTO: 0.42 10*3/MM3 (ref 0.1–0.9)
MONOCYTES NFR BLD AUTO: 7.9 % (ref 5–12)
NEUTROPHILS NFR BLD AUTO: 2.92 10*3/MM3 (ref 1.7–7)
NEUTROPHILS NFR BLD AUTO: 54.5 % (ref 42.7–76)
NITRITE UR QL STRIP: NEGATIVE
NRBC BLD AUTO-RTO: 0 /100 WBC (ref 0–0.2)
PH UR STRIP.AUTO: 6.5 [PH] (ref 5–8)
PHOSPHATE SERPL-MCNC: 2.9 MG/DL (ref 2.5–4.5)
PLATELET # BLD AUTO: 231 10*3/MM3 (ref 140–450)
PMV BLD AUTO: 10.5 FL (ref 6–12)
POTASSIUM SERPL-SCNC: 3.8 MMOL/L (ref 3.5–5.2)
PROT ?TM UR-MCNC: 6.6 MG/DL
PROT UR QL STRIP: NEGATIVE
PROT/CREAT UR: 0.07 MG/G{CREAT}
PTH-INTACT SERPL-MCNC: 76.9 PG/ML (ref 15–65)
RBC # BLD AUTO: 5.1 10*6/MM3 (ref 4.14–5.8)
SODIUM SERPL-SCNC: 142 MMOL/L (ref 136–145)
SP GR UR STRIP: 1.02 (ref 1–1.03)
URATE SERPL-MCNC: 6.7 MG/DL (ref 3.4–7)
UROBILINOGEN UR QL STRIP: NORMAL
WBC NRBC COR # BLD AUTO: 5.35 10*3/MM3 (ref 3.4–10.8)

## 2025-06-27 PROCEDURE — 85025 COMPLETE CBC W/AUTO DIFF WBC: CPT

## 2025-06-27 PROCEDURE — 82306 VITAMIN D 25 HYDROXY: CPT

## 2025-06-27 PROCEDURE — 80069 RENAL FUNCTION PANEL: CPT

## 2025-06-27 PROCEDURE — 84550 ASSAY OF BLOOD/URIC ACID: CPT

## 2025-06-27 PROCEDURE — 81003 URINALYSIS AUTO W/O SCOPE: CPT

## 2025-06-27 PROCEDURE — 83970 ASSAY OF PARATHORMONE: CPT

## 2025-06-27 PROCEDURE — 82570 ASSAY OF URINE CREATININE: CPT

## 2025-06-27 PROCEDURE — 36415 COLL VENOUS BLD VENIPUNCTURE: CPT

## 2025-06-27 PROCEDURE — 84156 ASSAY OF PROTEIN URINE: CPT

## 2025-07-28 ENCOUNTER — OFFICE VISIT (OUTPATIENT)
Dept: FAMILY MEDICINE CLINIC | Facility: CLINIC | Age: 40
End: 2025-07-28
Payer: MEDICAID

## 2025-07-28 VITALS — OXYGEN SATURATION: 98 % | WEIGHT: 254 LBS | HEIGHT: 77 IN | BODY MASS INDEX: 29.99 KG/M2 | HEART RATE: 86 BPM

## 2025-07-28 DIAGNOSIS — M25.511 ACUTE PAIN OF RIGHT SHOULDER: Primary | ICD-10-CM

## 2025-07-28 PROCEDURE — 1126F AMNT PAIN NOTED NONE PRSNT: CPT | Performed by: FAMILY MEDICINE

## 2025-07-28 PROCEDURE — 99213 OFFICE O/P EST LOW 20 MIN: CPT | Performed by: FAMILY MEDICINE

## 2025-07-28 NOTE — ASSESSMENT & PLAN NOTE
His previous x-rays showed some mild arthritis.  That was about 18 months ago.  Will repeat his x-ray.  He may use some over-the-counter anti-inflammatories such as ibuprofen or Aleve.  He does have an appointment with Ortho in about 3 weeks.  He wants to hold off on any further intervention and treatment till after that assessment.

## 2025-07-28 NOTE — LETTER
July 28, 2025     Patient: Carlos Ghosh   YOB: 1985   Date of Visit: 7/28/2025       To Whom It May Concern:    It is my medical opinion that Carlos Ghosh be put on light duty for the next 4 weeks . If you have any questions feel free to contact our office .          Sincerely,        Grant Gonsalez, DO    CC: No Recipients   [facility] coordinator mailed a letter to patient regarding Dr. Dotson's departure from .

## 2025-07-28 NOTE — PROGRESS NOTES
Chief Complaint   Patient presents with    Shoulder Pain     Right     PT DECLINED BP DUE TO TEAR IN ONE ARM AND PAIN IN THE OTHER ONE   PT DECLINED TEMPERATURE DUE TO HEADACHE         Subjective     Carlos Ghosh  has a past medical history of Anxiety disorder (08/28/2018), Chronic bilateral low back pain without sciatica (02/15/2019), Foot pain, left (07/11/2018), Foot pain, left, Foot pain, right (07/11/2018), Onychia of toe (08/13/2018), Pain of left heel, Paronychia of toe (08/13/2018), and Tinea unguium (07/11/2018).    Shoulder pain-he has had some right shoulder pain for the last couple of months.  He describes it more as a dull achy pain.  He states even at times his shoulder will pop and crackle.  He has not had any preceding unusual activities or injuries.  So far he has not had the need to take any medication for it.  He has not had any restriction of his movement but if he starts to raise it above his chest height it does start to become more painful.        PHQ-2 Depression Screening  Little interest or pleasure in doing things?     Feeling down, depressed, or hopeless?     PHQ-2 Total Score     PHQ-9 Depression Screening  Little interest or pleasure in doing things?     Feeling down, depressed, or hopeless?     Trouble falling or staying asleep, or sleeping too much?     Feeling tired or having little energy?     Poor appetite or overeating?     Feeling bad about yourself - or that you are a failure or have let yourself or your family down?     Trouble concentrating on things, such as reading the newspaper or watching television?     Moving or speaking so slowly that other people could have noticed? Or the opposite - being so fidgety or restless that you have been moving around a lot more than usual?     Thoughts that you would be better off dead, or of hurting yourself in some way?     PHQ-9 Total Score     If you checked off any problems, how difficult have these problems made it for you to do your  work, take care of things at home, or get along with other people?       No Known Allergies    Prior to Admission medications    Not on File        Patient Active Problem List   Diagnosis    Anxiety    Chronic bilateral low back pain without sciatica    Ingrown nail    Foot pain, left    Foot pain, right    Tinea unguium    Dysuria    Bilateral impacted cerumen    Microhematuria    Benign prostatic hyperplasia with lower urinary tract symptoms    Periodontal disease    Acute pain of left shoulder    Pain of maxillary sinus    Encounter for annual physical exam    Acute right-sided thoracic back pain    Post-nasal drainage    Elevated uric acid in blood    Essential tremor    Herpes zoster without complication    Kidney stone    Dark stools    Lower abdominal pain    Change in bowel habits    FH: colon cancer    Overweight (BMI 25.0-29.9)    Suspected sleep apnea    Snoring    Nephrolithiasis    Acute pain of right shoulder        Past Surgical History:   Procedure Laterality Date    COLONOSCOPY  2015    DR DILLON HATFIELD    COLONOSCOPY N/A 2/26/2025    Procedure: COLONOSCOPY;  Surgeon: Cira Montanez MD;  Location: [facility] ENDOSCOPY;  Service: Gastroenterology;  Laterality: N/A;  HEMORRHOIDS    EAR TUBES      TONSILLECTOMY         Social History     Socioeconomic History    Marital status: Single   Tobacco Use    Smoking status: Never     Passive exposure: Never    Smokeless tobacco: Never   Vaping Use    Vaping status: Never Used   Substance and Sexual Activity    Alcohol use: Never    Drug use: Never    Sexual activity: Defer       Family History   Problem Relation Age of Onset    Depression Mother     Paranoid behavior Father     OCD Father     Bipolar disorder Father     Early death Father 52        FATHER PASSED AWAY FROM COLON CANCER AT AGE 52    Colon cancer Father         FAMILY HISTORY OF COLON CANCER    No Known Problems Sister     No Known Problems Brother     No Known Problems Maternal Aunt      "No Known Problems Paternal Aunt     No Known Problems Maternal Uncle     No Known Problems Paternal Uncle     No Known Problems Maternal Grandfather     Dementia Maternal Grandmother     No Known Problems Paternal Grandfather     No Known Problems Paternal Grandmother     No Known Problems Cousin     No Known Problems Other     ADD / ADHD Neg Hx     Alcohol abuse Neg Hx     Anxiety disorder Neg Hx     Drug abuse Neg Hx     Schizophrenia Neg Hx     Seizures Neg Hx     Self-Injurious Behavior  Neg Hx     Suicide Attempts Neg Hx        Family history, surgical history, past medical history, Allergies and meds reviewed with patient today and updated in [facility] EMR.     ROS:  Review of Systems   Musculoskeletal:  Positive for arthralgias.       OBJECTIVE:  Vitals:    07/28/25 1148   Pulse: 86   SpO2: 98%   Weight: 115 kg (254 lb)   Height: 195.6 cm (77.01\")     No results found.   Body mass index is 30.11 kg/m².  No LMP for male patient.    The 10-year ASCVD risk score (Dayanara HOLGUIN, et al., 2019) is: 2.3%    Values used to calculate the score:      Age: 40 years      Sex: Male      Is Non- : No      Diabetic: Yes      Tobacco smoker: No      Systolic Blood Pressure: 110 mmHg      Is BP treated: No      HDL Cholesterol: 38 mg/dL      Total Cholesterol: 182 mg/dL     Physical Exam  Vitals and nursing note reviewed.   Constitutional:       General: He is not in acute distress.     Appearance: Normal appearance. He is normal weight.   HENT:      Head: Normocephalic.   Musculoskeletal:      Right shoulder: Tenderness and crepitus present. No swelling or deformity. Normal range of motion. Normal strength.      Comments: Pain with full range of motion   Neurological:      Mental Status: He is alert.         Procedures    No visits with results within 30 Day(s) from this visit.   Latest known visit with results is:   Lab on 06/27/2025   Component Date Value Ref Range Status    Uric Acid 06/27/2025 6.7  3.4 - " 7.0 mg/dL Final    Glucose 06/27/2025 104 (H)  65 - 99 mg/dL Final    BUN 06/27/2025 7.0  6.0 - 20.0 mg/dL Final    Creatinine 06/27/2025 0.95  0.76 - 1.27 mg/dL Final    Sodium 06/27/2025 142  136 - 145 mmol/L Final    Potassium 06/27/2025 3.8  3.5 - 5.2 mmol/L Final    Chloride 06/27/2025 106  98 - 107 mmol/L Final    CO2 06/27/2025 23.1  22.0 - 29.0 mmol/L Final    Calcium 06/27/2025 9.0  8.6 - 10.5 mg/dL Final    Albumin 06/27/2025 4.4  3.5 - 5.2 g/dL Final    Phosphorus 06/27/2025 2.9  2.5 - 4.5 mg/dL Final    Anion Gap 06/27/2025 12.9  5.0 - 15.0 mmol/L Final    BUN/Creatinine Ratio 06/27/2025 7.4  7.0 - 25.0 Final    eGFR 06/27/2025 103.1  >60.0 mL/min/1.73 Final    Creatinine, Urine 06/27/2025 99.2  mg/dL Final    Total Protein, Urine 06/27/2025 6.6  mg/dL Final    Protein/Creatinine Ratio, Urine 06/27/2025 0.07   Final    Color, UA 06/27/2025 Yellow  Yellow, Straw Final    Appearance, UA 06/27/2025 Clear  Clear Final    pH, UA 06/27/2025 6.5  5.0 - 8.0 Final    Specific Gravity, UA 06/27/2025 1.016  1.005 - 1.030 Final    Glucose, UA 06/27/2025 Negative  Negative Final    Ketones, UA 06/27/2025 Negative  Negative Final    Bilirubin, UA 06/27/2025 Negative  Negative Final    Blood, UA 06/27/2025 Negative  Negative Final    Protein, UA 06/27/2025 Negative  Negative Final    Leuk Esterase, UA 06/27/2025 Negative  Negative Final    Nitrite, UA 06/27/2025 Negative  Negative Final    Urobilinogen, UA 06/27/2025 0.2 E.U./dL  0.2 - 1.0 E.U./dL Final    25 Hydroxy, Vitamin D 06/27/2025 17.9 (L)  30.0 - 100.0 ng/ml Final    PTH, Intact 06/27/2025 76.9 (H)  15.0 - 65.0 pg/mL Final    WBC 06/27/2025 5.35  3.40 - 10.80 10*3/mm3 Final    RBC 06/27/2025 5.10  4.14 - 5.80 10*6/mm3 Final    Hemoglobin 06/27/2025 15.1  13.0 - 17.7 g/dL Final    Hematocrit 06/27/2025 45.3  37.5 - 51.0 % Final    MCV 06/27/2025 88.8  79.0 - 97.0 fL Final    MCH 06/27/2025 29.6  26.6 - 33.0 pg Final    MCHC 06/27/2025 33.3  31.5 - 35.7 g/dL  Final    RDW 06/27/2025 13.1  12.3 - 15.4 % Final    RDW-SD 06/27/2025 42.0  37.0 - 54.0 fl Final    MPV 06/27/2025 10.5  6.0 - 12.0 fL Final    Platelets 06/27/2025 231  140 - 450 10*3/mm3 Final    Neutrophil % 06/27/2025 54.5  42.7 - 76.0 % Final    Lymphocyte % 06/27/2025 33.1  19.6 - 45.3 % Final    Monocyte % 06/27/2025 7.9  5.0 - 12.0 % Final    Eosinophil % 06/27/2025 3.6  0.3 - 6.2 % Final    Basophil % 06/27/2025 0.7  0.0 - 1.5 % Final    Immature Grans % 06/27/2025 0.2  0.0 - 0.5 % Final    Neutrophils, Absolute 06/27/2025 2.92  1.70 - 7.00 10*3/mm3 Final    Lymphocytes, Absolute 06/27/2025 1.77  0.70 - 3.10 10*3/mm3 Final    Monocytes, Absolute 06/27/2025 0.42  0.10 - 0.90 10*3/mm3 Final    Eosinophils, Absolute 06/27/2025 0.19  0.00 - 0.40 10*3/mm3 Final    Basophils, Absolute 06/27/2025 0.04  0.00 - 0.20 10*3/mm3 Final    Immature Grans, Absolute 06/27/2025 0.01  0.00 - 0.05 10*3/mm3 Final    nRBC 06/27/2025 0.0  0.0 - 0.2 /100 WBC Final       ASSESSMENT/ PLAN:    Diagnoses and all orders for this visit:    1. Acute pain of right shoulder (Primary)  Assessment & Plan:  His previous x-rays showed some mild arthritis.  That was about 18 months ago.  Will repeat his x-ray.  He may use some over-the-counter anti-inflammatories such as ibuprofen or Aleve.  He does have an appointment with Ortho in about 3 weeks.  He wants to hold off on any further intervention and treatment till after that assessment.    Orders:  -     XR Shoulder 2+ View Right; Future        BMI is >= 30 and <35. (Class 1 Obesity). The following options were offered after discussion;: exercise counseling/recommendations and nutrition counseling/recommendations      Orders Placed Today:     No orders of the defined types were placed in this encounter.       Management Plan:     An After Visit Summary was printed and given to the patient at discharge.    Follow-up: Return if symptoms worsen or fail to improve.    Grant Gonsalez, DO  7/28/2025 12:41 EDT  This note was electronically signed.

## 2025-08-07 ENCOUNTER — TELEPHONE (OUTPATIENT)
Dept: ONCOLOGY | Facility: HOSPITAL | Age: 40
End: 2025-08-07
Payer: MEDICAID

## 2025-08-11 DIAGNOSIS — L91.8 MULTIPLE ACQUIRED SKIN TAGS: Primary | ICD-10-CM

## 2025-08-12 ENCOUNTER — OFFICE VISIT (OUTPATIENT)
Dept: ORTHOPEDIC SURGERY | Facility: CLINIC | Age: 40
End: 2025-08-12
Payer: MEDICAID

## 2025-08-12 VITALS — HEART RATE: 76 BPM | BODY MASS INDEX: 29.52 KG/M2 | HEIGHT: 77 IN | OXYGEN SATURATION: 96 % | WEIGHT: 250 LBS

## 2025-08-12 DIAGNOSIS — M75.41 IMPINGEMENT SYNDROME OF RIGHT SHOULDER: ICD-10-CM

## 2025-08-12 DIAGNOSIS — M25.511 RIGHT SHOULDER PAIN, UNSPECIFIED CHRONICITY: Primary | ICD-10-CM

## 2025-08-12 RX ORDER — DICLOFENAC SODIUM 75 MG/1
75 TABLET, DELAYED RELEASE ORAL 2 TIMES DAILY
Qty: 60 TABLET | Refills: 0 | Status: SHIPPED | OUTPATIENT
Start: 2025-08-12

## 2025-08-28 ENCOUNTER — HOSPITAL ENCOUNTER (OUTPATIENT)
Dept: ULTRASOUND IMAGING | Facility: HOSPITAL | Age: 40
Discharge: HOME | End: 2025-08-28
Payer: MEDICAID

## 2025-08-28 ENCOUNTER — LAB (OUTPATIENT)
Dept: LAB | Facility: HOSPITAL | Age: 40
End: 2025-08-28
Payer: MEDICAID

## 2025-08-28 DIAGNOSIS — R16.1 SPLENOMEGALY: ICD-10-CM

## 2025-08-28 LAB
ALBUMIN SERPL-MCNC: 4.7 G/DL (ref 3.5–5.2)
ALBUMIN/GLOB SERPL: 2.1 G/DL
ALP SERPL-CCNC: 60 U/L (ref 39–117)
ALT SERPL W P-5'-P-CCNC: 23 U/L (ref 1–41)
ANION GAP SERPL CALCULATED.3IONS-SCNC: 11.5 MMOL/L (ref 5–15)
AST SERPL-CCNC: 22 U/L (ref 1–40)
BASOPHILS # BLD AUTO: 0.05 10*3/MM3 (ref 0–0.2)
BASOPHILS NFR BLD AUTO: 0.9 % (ref 0–1.5)
BILIRUB SERPL-MCNC: 0.6 MG/DL (ref 0–1.2)
BUN SERPL-MCNC: 9.8 MG/DL (ref 6–20)
BUN/CREAT SERPL: 9.7 (ref 7–25)
CALCIUM SPEC-SCNC: 9.6 MG/DL (ref 8.6–10.5)
CHLORIDE SERPL-SCNC: 104 MMOL/L (ref 98–107)
CO2 SERPL-SCNC: 26.5 MMOL/L (ref 22–29)
CREAT SERPL-MCNC: 1.01 MG/DL (ref 0.76–1.27)
DEPRECATED RDW RBC AUTO: 41.1 FL (ref 37–54)
EGFRCR SERPLBLD CKD-EPI 2021: 95.8 ML/MIN/1.73
EOSINOPHIL # BLD AUTO: 0.18 10*3/MM3 (ref 0–0.4)
EOSINOPHIL NFR BLD AUTO: 3.4 % (ref 0.3–6.2)
ERYTHROCYTE [DISTWIDTH] IN BLOOD BY AUTOMATED COUNT: 13 % (ref 12.3–15.4)
GLOBULIN UR ELPH-MCNC: 2.2 GM/DL
GLUCOSE SERPL-MCNC: 111 MG/DL (ref 65–99)
HAV IGM SERPL QL IA: NORMAL
HBV CORE IGM SERPL QL IA: NORMAL
HBV SURFACE AG SERPL QL IA: NORMAL
HCT VFR BLD AUTO: 44.9 % (ref 37.5–51)
HCV AB SER QL: NORMAL
HGB BLD-MCNC: 15.5 G/DL (ref 13–17.7)
IMM GRANULOCYTES # BLD AUTO: 0.01 10*3/MM3 (ref 0–0.05)
IMM GRANULOCYTES NFR BLD AUTO: 0.2 % (ref 0–0.5)
LYMPHOCYTES # BLD AUTO: 1.75 10*3/MM3 (ref 0.7–3.1)
LYMPHOCYTES NFR BLD AUTO: 33 % (ref 19.6–45.3)
MCH RBC QN AUTO: 30 PG (ref 26.6–33)
MCHC RBC AUTO-ENTMCNC: 34.5 G/DL (ref 31.5–35.7)
MCV RBC AUTO: 86.8 FL (ref 79–97)
MONOCYTES # BLD AUTO: 0.44 10*3/MM3 (ref 0.1–0.9)
MONOCYTES NFR BLD AUTO: 8.3 % (ref 5–12)
NEUTROPHILS NFR BLD AUTO: 2.87 10*3/MM3 (ref 1.7–7)
NEUTROPHILS NFR BLD AUTO: 54.2 % (ref 42.7–76)
NRBC BLD AUTO-RTO: 0 /100 WBC (ref 0–0.2)
PLATELET # BLD AUTO: 229 10*3/MM3 (ref 140–450)
PMV BLD AUTO: 9.7 FL (ref 6–12)
POTASSIUM SERPL-SCNC: 4.4 MMOL/L (ref 3.5–5.2)
PROT SERPL-MCNC: 6.9 G/DL (ref 6–8.5)
RBC # BLD AUTO: 5.17 10*6/MM3 (ref 4.14–5.8)
SODIUM SERPL-SCNC: 142 MMOL/L (ref 136–145)
WBC NRBC COR # BLD AUTO: 5.3 10*3/MM3 (ref 3.4–10.8)

## 2025-08-28 PROCEDURE — 80053 COMPREHEN METABOLIC PANEL: CPT

## 2025-08-28 PROCEDURE — 36415 COLL VENOUS BLD VENIPUNCTURE: CPT

## 2025-08-28 PROCEDURE — 80074 ACUTE HEPATITIS PANEL: CPT

## 2025-08-28 PROCEDURE — 85025 COMPLETE CBC W/AUTO DIFF WBC: CPT

## 2025-08-28 PROCEDURE — 76705 ECHO EXAM OF ABDOMEN: CPT

## (undated) DEVICE — CONN JET HYDRA H20 AUXILIARY DISP

## (undated) DEVICE — SOL IRRG H2O PL/BG 1000ML STRL

## (undated) DEVICE — LINER SURG CANSTR SXN S/RIGD 1500CC

## (undated) DEVICE — Device

## (undated) DEVICE — THE STERILE LIGHT HANDLE COVER IS USED WITH STERIS SURGICAL LIGHTING AND VISUALIZATION SYSTEMS.

## (undated) DEVICE — SOLIDIFIER LIQLOC PLS 1500CC BT

## (undated) DEVICE — Device: Brand: DEFENDO AIR/WATER/SUCTION AND BIOPSY VALVE